# Patient Record
Sex: MALE | Race: WHITE | NOT HISPANIC OR LATINO | Employment: OTHER | URBAN - METROPOLITAN AREA
[De-identification: names, ages, dates, MRNs, and addresses within clinical notes are randomized per-mention and may not be internally consistent; named-entity substitution may affect disease eponyms.]

---

## 2018-04-20 ENCOUNTER — TELEPHONE (OUTPATIENT)
Dept: CARDIOLOGY | Facility: CLINIC | Age: 78
End: 2018-04-20

## 2018-04-20 RX ORDER — PROPAFENONE HYDROCHLORIDE 425 MG/1
425 CAPSULE, EXTENDED RELEASE ORAL EVERY 12 HOURS
Qty: 180 CAPSULE | Refills: 3 | OUTPATIENT
Start: 2018-04-20

## 2018-04-20 RX ORDER — PROPAFENONE HYDROCHLORIDE 425 MG/1
425 CAPSULE, EXTENDED RELEASE ORAL EVERY 12 HOURS
COMMUNITY
Start: 2017-05-23 | End: 2018-04-20 | Stop reason: SDUPTHER

## 2018-04-20 RX ORDER — PROPAFENONE HYDROCHLORIDE 425 MG/1
425 CAPSULE, EXTENDED RELEASE ORAL EVERY 12 HOURS
Qty: 180 CAPSULE | Refills: 3 | Status: SHIPPED | OUTPATIENT
Start: 2018-04-20 | End: 2018-05-04 | Stop reason: SDUPTHER

## 2018-05-03 PROBLEM — Z79.899 LONG TERM CURRENT USE OF ANTIARRHYTHMIC DRUG: Status: ACTIVE | Noted: 2018-05-03

## 2018-05-03 PROBLEM — I47.19 AVNRT (AV NODAL RE-ENTRY TACHYCARDIA) (CMS/HCC): Status: ACTIVE | Noted: 2018-05-03

## 2018-05-03 PROBLEM — Z79.01 CURRENT USE OF LONG TERM ANTICOAGULATION: Status: ACTIVE | Noted: 2018-05-03

## 2018-05-03 PROBLEM — I48.92 ATRIAL FIBRILLATION AND FLUTTER (CMS/HCC): Status: ACTIVE | Noted: 2018-05-03

## 2018-05-03 PROBLEM — I48.91 ATRIAL FIBRILLATION AND FLUTTER (CMS/HCC): Status: ACTIVE | Noted: 2018-05-03

## 2018-05-03 NOTE — PROGRESS NOTES
Electrophysiology  Outpatient Progress Note       Reason for visit:   Chief Complaint   Patient presents with   • Atrial Fibrillation   • Atrial Flutter       HPI   Matthew Rojas is a 77 y.o. male who is seen today for follow-up of his atrial arrhythmias. He has a history of atrial flutter and AVNRT both ablated at [facility] in the past. Mr. Rojas underwent Holter monitoring May 2017 that revealed 87% afib with heart rates ranging from  bpm with an average of 79 bpm. He had previously in April 2017 had a QUINCY/cardioversion with resumption of sinus rhythm. He is on propafenone and Eliquis.     At his previous appointment options of treatment where discussed, including antiarrhythmic/drug therapy and catheter ablation.  He opted for higher dose of his propafenone as a first course of treatment.  He does note occasional episodes of mild palpitations, but does not feel he has had any sustained episodes of atrial fibrillation.  He is tolerating the higher dose of Rythmol with no significant side effects.     Past Medical History:   Diagnosis Date   • Abnormal ECG    • Arrhythmia    • Atrial fibrillation and flutter (CMS/HCC) ([facility]) 5/3/2018   • AVNRT (AV mayra re-entry tachycardia) (CMS/HCC) ([facility]) 5/3/2018   • Current use of long term anticoagulation 5/3/2018   • Long term current use of antiarrhythmic drug 5/3/2018   • Prostate cancer (CMS/HCC) ([facility])      Past Surgical History:   Procedure Laterality Date   • ABLATION OF DYSRHYTHMIC FOCUS     • APPENDECTOMY         Social History   Substance Use Topics   • Smoking status: Former Smoker   • Smokeless tobacco: Never Used   • Alcohol use Yes     No family history on file.    ALLERGY:  Flecainide    Current Outpatient Prescriptions   Medication Sig Dispense Refill   • apixaban (ELIQUIS) 5 mg tablet Take 1 tablet (5 mg total) by mouth 2 (two) times a day. 60 tablet 11   • ascorbic acid (ascorbic acid with ramesh hips) 500 mg tablet Take by mouth daily.      • atorvastatin (LIPITOR) 20 mg tablet Take 20 mg by mouth daily.     • CHOLECALCIFEROL, VITAMIN D3, (VITAMIN D3 ORAL) Take by mouth daily.     • glucosamine sulfate 1,000 mg capsule Take by mouth daily.     • mirabegron (MYRBETRIQ) 50 mg ER tablet Take 50 mg by mouth daily.     • propafenone SR (RYTHMOL SR) 425 mg 12 hr capsule Take 1 capsule (425 mg total) by mouth every 12 (twelve) hours. 60 capsule 11     No current facility-administered medications for this visit.        Review of Systems   Constitution: Negative for weakness and malaise/fatigue.   HENT: Negative for hearing loss.    Eyes: Negative for visual disturbance.   Cardiovascular: Negative for chest pain, dyspnea on exertion, irregular heartbeat, leg swelling, near-syncope, orthopnea, palpitations, paroxysmal nocturnal dyspnea and syncope.   Respiratory: Negative for cough and shortness of breath.    Hematologic/Lymphatic: Negative for bleeding problem.   Skin: Negative for rash.   Musculoskeletal: Negative for joint pain and muscle weakness.   Gastrointestinal: Negative for abdominal pain.   Genitourinary: Negative for hematuria.   Neurological: Negative for focal weakness, paresthesias and tremors.   Psychiatric/Behavioral: Negative for altered mental status.       Objective   Vitals:    05/04/18 1607   BP: 128/70   Pulse: 60   Resp: 16       Physical Exam   Constitutional: He is oriented to person, place, and time. He appears well-developed and well-nourished.   HENT:   Head: Normocephalic and atraumatic.   Eyes: Conjunctivae are normal. Pupils are equal, round, and reactive to light.   Neck: Normal range of motion.   Cardiovascular: Normal rate, regular rhythm and normal heart sounds.    Pulmonary/Chest: Breath sounds normal. He has no wheezes. He has no rales.   Abdominal: Soft. Bowel sounds are normal. He exhibits no mass. There is no tenderness.   Musculoskeletal: Normal range of motion. He exhibits no edema or deformity.   Neurological: He is  alert and oriented to person, place, and time. No cranial nerve deficit.   Skin: Skin is warm and dry. No rash noted.   Psychiatric: He has a normal mood and affect. His behavior is normal.       Lab Results   Component Value Date    WBC 8.58 11/27/2016    HGB 14.5 11/27/2016     11/27/2016    CHOL 224 (H) 03/31/2015    TRIG 135 03/31/2015    HDL 50 03/31/2015    ALT 33 03/31/2015    AST 28 03/31/2015     11/27/2016    K 4.3 11/27/2016    CREATININE 0.9 11/27/2016    TSH 2.22 03/30/2015    INR 1.0 11/27/2016     Cardiovascular Procedures:  Electrophysiology:  Holter (SR with atrial fibrillation) - 5/2017   QUINCY/cardioversion-April 2017  Catheter ablation-at [facility] for AV node reentry and atrial flutter    ECG   Sinus rhythm and within normal limits.    Assessment   Problem List Items Addressed This Visit        Circulatory    Atrial fibrillation and flutter (CMS/HCC) (HCC) - Primary     He is tolerating the increased dose of propafenone at 425 mg SR, twice daily.  His arrhythmia at this time appears to be well controlled and will continue this dosage.  He will continue to be followed closely for any recurrence of arrhythmias.         Relevant Medications    atorvastatin (LIPITOR) 20 mg tablet    apixaban (ELIQUIS) 5 mg tablet    propafenone SR (RYTHMOL SR) 425 mg 12 hr capsule    Other Relevant Orders    ECG 12 lead (Completed)    AVNRT (AV mayra re-entry tachycardia) (CMS/HCC) (HCC)     He is status post ablation procedure at [facility] in the past.  He has had no symptoms that are suggestive of a recurrence of this tachycardia.         Relevant Medications    atorvastatin (LIPITOR) 20 mg tablet    apixaban (ELIQUIS) 5 mg tablet    propafenone SR (RYTHMOL SR) 425 mg 12 hr capsule       Other    Long term current use of antiarrhythmic drug     He is tolerating the increased dose of propafenone and the electrocardiogram is without abnormalities at today's visit.         Current use of  long term anticoagulation     Currently on Eliquis 5 mg twice daily with no adverse effects and no signs of bleeding.                    Krystian Garcia MD  5/18/2018

## 2018-05-03 NOTE — ASSESSMENT & PLAN NOTE
He is status post ablation procedure at [facility] in the past.  He has had no symptoms that are suggestive of a recurrence of this tachycardia.

## 2018-05-04 ENCOUNTER — OFFICE VISIT (OUTPATIENT)
Dept: CARDIOLOGY | Facility: CLINIC | Age: 78
End: 2018-05-04
Payer: MEDICARE

## 2018-05-04 VITALS
HEART RATE: 60 BPM | SYSTOLIC BLOOD PRESSURE: 128 MMHG | DIASTOLIC BLOOD PRESSURE: 70 MMHG | RESPIRATION RATE: 16 BRPM | WEIGHT: 183 LBS

## 2018-05-04 DIAGNOSIS — I48.91 ATRIAL FIBRILLATION AND FLUTTER (CMS/HCC): Primary | ICD-10-CM

## 2018-05-04 DIAGNOSIS — I47.19 AVNRT (AV NODAL RE-ENTRY TACHYCARDIA) (CMS/HCC): ICD-10-CM

## 2018-05-04 DIAGNOSIS — Z79.899 LONG TERM CURRENT USE OF ANTIARRHYTHMIC DRUG: ICD-10-CM

## 2018-05-04 DIAGNOSIS — Z79.01 CURRENT USE OF LONG TERM ANTICOAGULATION: ICD-10-CM

## 2018-05-04 DIAGNOSIS — I48.92 ATRIAL FIBRILLATION AND FLUTTER (CMS/HCC): Primary | ICD-10-CM

## 2018-05-04 PROCEDURE — 99214 OFFICE O/P EST MOD 30 MIN: CPT | Performed by: INTERNAL MEDICINE

## 2018-05-04 PROCEDURE — 93000 ELECTROCARDIOGRAM COMPLETE: CPT | Performed by: INTERNAL MEDICINE

## 2018-05-04 RX ORDER — ASCORBIC ACID 500 MG
TABLET ORAL DAILY
COMMUNITY

## 2018-05-04 RX ORDER — ATORVASTATIN CALCIUM 20 MG/1
20 TABLET, FILM COATED ORAL DAILY
COMMUNITY

## 2018-05-04 RX ORDER — MIRABEGRON 50 MG/1
50 TABLET, FILM COATED, EXTENDED RELEASE ORAL DAILY
COMMUNITY

## 2018-05-04 RX ORDER — PROPAFENONE HYDROCHLORIDE 425 MG/1
425 CAPSULE, EXTENDED RELEASE ORAL EVERY 12 HOURS
Qty: 60 CAPSULE | Refills: 11 | Status: SHIPPED | OUTPATIENT
Start: 2018-05-04 | End: 2019-05-21 | Stop reason: SDUPTHER

## 2018-05-04 RX ORDER — PETROLATUM,WHITE/LANOLIN
OINTMENT (GRAM) TOPICAL DAILY
COMMUNITY

## 2018-05-04 NOTE — LETTER
May 18, 2018     Bridgette Galvez MD  [address] [place] [address]    Patient: Matthew Rojas   YOB: 1940   Date of Visit: 5/4/2018       Dear Dr. Galvez:    Thank you for referring Matthew Rojas to me for evaluation. Below are my notes for this consultation.    If you have questions, please do not hesitate to call me. I look forward to following your patient along with you.         Sincerely,        Krystian Garcia MD        CC: No Recipients  Krystian Garcia MD  5/18/2018  7:39 PM  Sign at close encounter       Electrophysiology  Outpatient Progress Note       Reason for visit:   Chief Complaint   Patient presents with   • Atrial Fibrillation   • Atrial Flutter       HPI   Matthew Rojas is a 77 y.o. male who is seen today for follow-up of his atrial arrhythmias. He has a history of atrial flutter and AVNRT both ablated at [facility] in the past. Mr. Rojas underwent Holter monitoring May 2017 that revealed 87% afib with heart rates ranging from  bpm with an average of 79 bpm. He had previously in April 2017 had a QUINCY/cardioversion with resumption of sinus rhythm. He is on propafenone and Eliquis.     At his previous appointment options of treatment where discussed, including antiarrhythmic/drug therapy and catheter ablation.  He opted for higher dose of his propafenone as a first course of treatment.  He does note occasional episodes of mild palpitations, but does not feel he has had any sustained episodes of atrial fibrillation.  He is tolerating the higher dose of Rythmol with no significant side effects.     Past Medical History:   Diagnosis Date   • Abnormal ECG    • Arrhythmia    • Atrial fibrillation and flutter (CMS/HCC) ([facility]) 5/3/2018   • AVNRT (AV mayra re-entry tachycardia) (CMS/HCC) ([facility]) 5/3/2018   • Current use of long term anticoagulation 5/3/2018   • Long term current use of antiarrhythmic drug 5/3/2018   • Prostate cancer (CMS/HCC) ([facility])      Past  Surgical History:   Procedure Laterality Date   • ABLATION OF DYSRHYTHMIC FOCUS     • APPENDECTOMY         Social History   Substance Use Topics   • Smoking status: Former Smoker   • Smokeless tobacco: Never Used   • Alcohol use Yes     No family history on file.    ALLERGY:  Flecainide    Current Outpatient Prescriptions   Medication Sig Dispense Refill   • apixaban (ELIQUIS) 5 mg tablet Take 1 tablet (5 mg total) by mouth 2 (two) times a day. 60 tablet 11   • ascorbic acid (ascorbic acid with ramesh hips) 500 mg tablet Take by mouth daily.     • atorvastatin (LIPITOR) 20 mg tablet Take 20 mg by mouth daily.     • CHOLECALCIFEROL, VITAMIN D3, (VITAMIN D3 ORAL) Take by mouth daily.     • glucosamine sulfate 1,000 mg capsule Take by mouth daily.     • mirabegron (MYRBETRIQ) 50 mg ER tablet Take 50 mg by mouth daily.     • propafenone SR (RYTHMOL SR) 425 mg 12 hr capsule Take 1 capsule (425 mg total) by mouth every 12 (twelve) hours. 60 capsule 11     No current facility-administered medications for this visit.        ROS    Objective   Vitals:    05/04/18 1607   BP: 128/70   Pulse: 60   Resp: 16       Physical Exam    Lab Results   Component Value Date    WBC 8.58 11/27/2016    HGB 14.5 11/27/2016     11/27/2016    CHOL 224 (H) 03/31/2015    TRIG 135 03/31/2015    HDL 50 03/31/2015    ALT 33 03/31/2015    AST 28 03/31/2015     11/27/2016    K 4.3 11/27/2016    CREATININE 0.9 11/27/2016    TSH 2.22 03/30/2015    INR 1.0 11/27/2016     Cardiovascular Procedures:  Electrophysiology:  Holter (SR with atrial fibrillation) - 5/2017   QUINCY/cardioversion-April 2017  Catheter ablation-at [facility] for AV node reentry and atrial flutter    ECG   ***    Assessment   Problem List Items Addressed This Visit        Circulatory    Atrial fibrillation and flutter (CMS/HCC) (HCC) - Primary     He is tolerating the increased dose of propafenone at 425 mg SR, twice daily.  His arrhythmia at this time appears to be  well controlled and will continue this dosage.  He will continue to be followed closely for any recurrence of arrhythmias.         Relevant Medications    atorvastatin (LIPITOR) 20 mg tablet    apixaban (ELIQUIS) 5 mg tablet    propafenone SR (RYTHMOL SR) 425 mg 12 hr capsule    Other Relevant Orders    ECG 12 lead (Completed)    AVNRT (AV mayra re-entry tachycardia) (CMS/HCC) (HCC)     He is status post ablation procedure at [facility] in the past.  He has had no symptoms that are suggestive of a recurrence of this tachycardia.         Relevant Medications    atorvastatin (LIPITOR) 20 mg tablet    apixaban (ELIQUIS) 5 mg tablet    propafenone SR (RYTHMOL SR) 425 mg 12 hr capsule       Other    Long term current use of antiarrhythmic drug     He is tolerating the increased dose of propafenone and the electrocardiogram is without abnormalities at today's visit.         Current use of long term anticoagulation     Currently on Eliquis 5 mg twice daily with no adverse effects and no signs of bleeding.                    Krystian Garcia MD  5/18/2018

## 2018-05-09 NOTE — ASSESSMENT & PLAN NOTE
He is tolerating the increased dose of propafenone and the electrocardiogram is without abnormalities at today's visit.

## 2018-05-09 NOTE — ASSESSMENT & PLAN NOTE
He is tolerating the increased dose of propafenone at 425 mg SR, twice daily.  His arrhythmia at this time appears to be well controlled and will continue this dosage.  He will continue to be followed closely for any recurrence of arrhythmias.

## 2018-05-18 ASSESSMENT — ENCOUNTER SYMPTOMS
PALPITATIONS: 0
TREMORS: 0
PARESTHESIAS: 0
SHORTNESS OF BREATH: 0
SYNCOPE: 0
NEAR-SYNCOPE: 0
ALTERED MENTAL STATUS: 0
ORTHOPNEA: 0
HEMATURIA: 0
WEAKNESS: 0
IRREGULAR HEARTBEAT: 0
COUGH: 0
PND: 0
ABDOMINAL PAIN: 0
DYSPNEA ON EXERTION: 0
FOCAL WEAKNESS: 0

## 2019-05-21 RX ORDER — PROPAFENONE HYDROCHLORIDE 425 MG/1
425 CAPSULE, EXTENDED RELEASE ORAL EVERY 12 HOURS
Qty: 60 CAPSULE | Refills: 11 | Status: SHIPPED | OUTPATIENT
Start: 2019-05-21 | End: 2020-05-15 | Stop reason: SDUPTHER

## 2019-07-15 ENCOUNTER — HOSPITAL ENCOUNTER (EMERGENCY)
Facility: HOSPITAL | Age: 79
Discharge: HOME | End: 2019-07-15
Attending: EMERGENCY MEDICINE
Payer: MEDICARE

## 2019-07-15 VITALS
TEMPERATURE: 97.1 F | HEART RATE: 68 BPM | DIASTOLIC BLOOD PRESSURE: 78 MMHG | SYSTOLIC BLOOD PRESSURE: 148 MMHG | RESPIRATION RATE: 16 BRPM | OXYGEN SATURATION: 97 %

## 2019-07-15 DIAGNOSIS — S80.812A ABRASION OF ANTERIOR LEFT LOWER LEG, INITIAL ENCOUNTER: Primary | ICD-10-CM

## 2019-07-15 PROCEDURE — 99281 EMR DPT VST MAYX REQ PHY/QHP: CPT

## 2019-07-15 RX ORDER — ZALEPLON 10 MG/1
10 CAPSULE ORAL NIGHTLY PRN
COMMUNITY
Start: 2019-06-19 | End: 2024-07-09

## 2019-07-15 RX ORDER — TRAZODONE HYDROCHLORIDE 50 MG/1
TABLET ORAL NIGHTLY PRN
COMMUNITY
Start: 2019-06-19

## 2019-07-15 ASSESSMENT — ENCOUNTER SYMPTOMS
COLOR CHANGE: 0
DIARRHEA: 0
SORE THROAT: 0
ABDOMINAL PAIN: 0
WEAKNESS: 0
NUMBNESS: 0
WOUND: 1
EXTREMITY NUMBNESS: 0
FATIGUE: 0
FEVER: 0
VOMITING: 0
COUGH: 0
SHORTNESS OF BREATH: 0

## 2019-07-16 ASSESSMENT — ENCOUNTER SYMPTOMS
CONFUSION: 0
APPETITE CHANGE: 0

## 2019-07-16 NOTE — ED PROVIDER NOTES
HPI     Chief Complaint   Patient presents with   • Lower Extremity Issue     Matthew Rojas is a 78 y.o. male with a PMHx of Afib (Eliquis) who presents to the ED for evaluation of LLE wound s/p injury (abrasion) 10 days PTA. He states that he hit his leg on the bumper of his car. Pt notes that he was evaluated at  4 days PTA, tx with RX of Keflex 500mg TID and a topical antibiotic. Today is day 4.  Pt was prompted to seek care in the ED due to increasing redness around the wound. Pt with allergy to flecainide. Denies chest pain, shortness of breath, abdominal pain, nausea, vomiting, diarrhea, fever, chills, numbness, tingling, gait difficulty, or any other concerns.     PCP: Bridgette Galvez MD       History provided by:  Patient   used: No    Lower Extremity Issue   Location:  Leg  Time since incident:  10 days  Injury: yes    Mechanism of injury comment:  Abrasion by metal car bumper  Leg location:  L lower leg  Pain details:     Quality:  Aching    Radiates to:  Does not radiate    Severity:  Mild    Onset quality:  Gradual    Duration:  10 days    Timing:  Constant    Progression:  Improving  Chronicity:  New  Dislocation: no    Foreign body present:  No foreign bodies  Tetanus status:  Unknown  Prior injury to area:  No  Relieved by: Rx PO and topical abx.  Worsened by:  Nothing  Associated symptoms: no decreased ROM, no fatigue, no fever, no numbness, no swelling and no tingling    Risk factors: no obesity         Patient History     Past Medical History:   Diagnosis Date   • Abnormal ECG    • Arrhythmia    • Atrial fibrillation and flutter (CMS/HCC) ([facility]) 5/3/2018   • AVNRT (AV mayra re-entry tachycardia) (CMS/HCC) ([facility]) 5/3/2018   • Current use of long term anticoagulation 5/3/2018   • Long term current use of antiarrhythmic drug 5/3/2018   • Prostate cancer (CMS/HCC) ([facility])        Past Surgical History:   Procedure Laterality Date   • ABLATION OF DYSRHYTHMIC FOCUS     • APPENDECTOMY          History reviewed. No pertinent family history.    Social History   Substance Use Topics   • Smoking status: Former Smoker   • Smokeless tobacco: Never Used   • Alcohol use Yes       Systems Reviewed from Nursing Triage:          Review of Systems     Review of Systems   Constitutional: Negative for appetite change, fatigue and fever.   HENT: Negative for sore throat.    Respiratory: Negative for cough and shortness of breath.    Cardiovascular: Negative for chest pain.   Gastrointestinal: Negative for abdominal pain, diarrhea and vomiting.   Musculoskeletal: Negative for gait problem.        Leg pain   Skin: Positive for wound (to anterior LLE, no active bleeding). Negative for color change.   Neurological: Negative for weakness and numbness.   Psychiatric/Behavioral: Negative for confusion.        Physical Exam     ED Triage Vitals [07/15/19 2147]   Temp Heart Rate Resp BP SpO2   36.2 °C (97.1 °F) 66 18 (!) 148/78 96 %      Temp Source Heart Rate Source Patient Position BP Location FiO2 (%) (Set)   Tympanic -- -- -- --       Pulse Ox %: 96 % (07/15/19 2254)  Pulse Ox Interpretation: Normal (07/15/19 2254)          Patient Vitals for the past 24 hrs:   BP Temp Temp src Pulse Resp SpO2   07/15/19 2309 - - - 68 16 97 %   07/15/19 2147 (!) 148/78 36.2 °C (97.1 °F) Tympanic 66 18 96 %           Physical Exam   Constitutional: He is oriented to person, place, and time. He appears well-developed. No distress.   HENT:   Head: Atraumatic.   Nose: Nose normal.   Eyes: Pupils are equal, round, and reactive to light. Conjunctivae are normal.   Neck: Normal range of motion. Neck supple.   Cardiovascular: Normal rate and intact distal pulses.    Pulses:       Dorsalis pedis pulses are 2+ on the left side.        Posterior tibial pulses are 2+ on the left side.   Pulmonary/Chest: Effort normal.   Musculoskeletal: Normal range of motion. He exhibits no edema, tenderness or deformity.        Left ankle: Normal. He exhibits  normal range of motion, no swelling and no ecchymosis. No tenderness. No proximal fibula tenderness found.        Left lower leg: He exhibits laceration (abrasion). He exhibits no tenderness, no bony tenderness and no swelling.        Legs:       Left foot: Normal. There is normal range of motion, no tenderness and no bony tenderness.   Neurological: He is alert and oriented to person, place, and time. He has normal strength. He is not disoriented. No sensory deficit. Gait normal.   Skin: Skin is warm and dry. Abrasion noted.   2 cm abrasion, healing well with scant amount of erythema. No warmth.   Psychiatric: He has a normal mood and affect. His behavior is normal.   Nursing note and vitals reviewed.           Procedures    ED Course & MDM     Labs Reviewed - No data to display    No orders to display               MDM         ED Course as of Jul 16 0305   Mon Jul 15, 2019   2247 10:47 PM  Pt is a 78 y.o. male presenting to the ED for evaluation of LLE wound. Pt was seen and evaluated. Discussed with pt regarding treatment plan. Wound appears well with no signs of a worsening infection. Pt instructed to continue the po and topical antibiotic as prescribed    [LB]      ED Course User Index  [LB] Leann Karimi PA C         Clinical Impressions as of Jul 16 0305   Abrasion of anterior left lower leg, initial encounter      By signing my name below, Gunnar SANDOVAL, attest that this documentation has been prepared under the direction and in the presence of Leann Karimi PA-C.  7/16/2019 3:05 AM    Leann SANDOVAL, personally performed the services described in this documentation, as documented by the scribe in my presence, and it is both accurate and complete.  7/16/2019 3:06 AM     Gunnar Rowley  07/15/19 7864       Leann Karimi PA C  07/16/19 030

## 2019-07-16 NOTE — DISCHARGE INSTRUCTIONS
KEEP THE WOUND CLEAN AND DRY, WASH WITH SOAP AND WATER  APPLY TOPICAL ANTIBIOTIC OINTMENT   DO NOT USE PEROXIDE OR ALCOHOL    CONTINUE THE KEFELX AS DIRECTED.   TAKE TYLENOL AS NEEDED FOR PAIN    RETURN TO THE ER IF ANY INCREASE IN FEVER>101, INCREASE LEG PAIN, SWELLING, NUMBNESS OR TINGLING IN YOUR TOES, CHANGE IN COLOR OR TEMPERATURE OF YOUR TOES, INCREASE REDNESS OR WARMTH AROUND THE WOUND, CALF PAIN, CHEST PAIN, SHORTNESS OF BREATH OR ANY CONCERNS

## 2019-07-16 NOTE — ED ATTESTATION NOTE
I have personally seen and examined the patient.  I reviewed and agree with physician assistant / nurse practitioner’s assessment and plan of care    My examination, assessment, and plan of care of  is as follows:     Patient presents with abrasion on his left shin that occurred while he was unsure.  He is on urgent care and is taking Keflex.  Someone looked at it and said it looked slightly red so he came in for second opinion    Exam   patient awake alert and oriented no acute distress  Left shin has small 1 cm abrasion that is healing well no surround erythema or warmth      Plan gave patient reassurance and encouraged him to continue taking antibiotics.  Patient discharged     Sandeep Alcazar MD  07/16/19 3108

## 2020-01-17 ENCOUNTER — TELEPHONE (OUTPATIENT)
Dept: SCHEDULING | Facility: CLINIC | Age: 80
End: 2020-01-17

## 2020-01-17 NOTE — TELEPHONE ENCOUNTER
Pt is having an epidural in his back on 1/22 by Dr Hill.  Pt would like to know instructions for holding his Eliquis pre procedure and when to start it post procedure.  Pt can be reached at [phone].

## 2020-01-17 NOTE — TELEPHONE ENCOUNTER
He has not been seen in this office in a was 2 years.  I explained that he may hold his Eliquis 2 to 3 days prior to his epidural.  I am unaware of his kidney function as he has no recent lab work through [facility].  He should restart it as soon as possible at the discretion of his orthopedic physician.  We also discussed the low risk of stroke anytime anticoagulation is interrupted.  He is aware of this risk.    I asked him to call the office to make it a follow-up appointment.

## 2020-05-15 ENCOUNTER — TELEPHONE (OUTPATIENT)
Dept: CARDIOLOGY | Facility: CLINIC | Age: 80
End: 2020-05-15

## 2020-05-15 RX ORDER — PROPAFENONE HYDROCHLORIDE 425 MG/1
425 CAPSULE, EXTENDED RELEASE ORAL EVERY 12 HOURS
Qty: 60 CAPSULE | Refills: 0 | Status: SHIPPED | OUTPATIENT
Start: 2020-05-15 | End: 2020-07-17 | Stop reason: SDUPTHER

## 2020-05-15 NOTE — TELEPHONE ENCOUNTER
Last appointment was 5/4/18, no upcoming appointments scheduled.    Gave a 60 day supply with no refills.    PSR - Please call patient to schedule a follow up appointment. Thank you

## 2020-05-15 NOTE — TELEPHONE ENCOUNTER
HI.     Last appointment was 5/4/18, no upcoming appointments scheduled.     Gave a 60 day supply of propafenone with no refills.     PSR - Please call patient to schedule a follow up appointment. He needs EKG as it has been a long while      Thank you

## 2020-06-01 ENCOUNTER — TELEPHONE (OUTPATIENT)
Dept: CARDIOLOGY | Facility: CLINIC | Age: 80
End: 2020-06-01

## 2020-06-01 NOTE — TELEPHONE ENCOUNTER
Called patient left message regarding change in appointment from 6/9/20 to 6/12/20 @1:40pm please confirm change.

## 2020-06-11 NOTE — TELEPHONE ENCOUNTER
Tried calling patient to pre register for appt with dr culver tomorrow. Every time I would try to leave a voicemail the machine would cut me off.

## 2020-07-17 ENCOUNTER — TELEPHONE (OUTPATIENT)
Dept: SCHEDULING | Facility: CLINIC | Age: 80
End: 2020-07-17

## 2020-07-17 RX ORDER — PROPAFENONE HYDROCHLORIDE 425 MG/1
425 CAPSULE, EXTENDED RELEASE ORAL EVERY 12 HOURS
Qty: 60 CAPSULE | Refills: 1 | Status: SHIPPED | OUTPATIENT
Start: 2020-07-17 | End: 2020-09-25

## 2020-07-17 NOTE — TELEPHONE ENCOUNTER
Medicine Refill Request    Last Office Visit: Visit date not found  Last Telemedicine Visit: Visit date not found    Next Office Visit: Visit date not found  Next Telemedicine Visit: Visit date not found     propafenone SR (RYTHMOL SR) 425 mg 12 hr capsule         Current Outpatient Medications:   •  apixaban (ELIQUIS) 5 mg tablet, Take 1 tablet (5 mg total) by mouth 2 (two) times a day., Disp: 60 tablet, Rfl: 11  •  ascorbic acid (ascorbic acid with ramesh hips) 500 mg tablet, Take by mouth daily., Disp: , Rfl:   •  atorvastatin (LIPITOR) 20 mg tablet, Take 20 mg by mouth daily., Disp: , Rfl:   •  CHOLECALCIFEROL, VITAMIN D3, (VITAMIN D3 ORAL), Take by mouth daily., Disp: , Rfl:   •  glucosamine sulfate 1,000 mg capsule, Take by mouth daily., Disp: , Rfl:   •  mirabegron (MYRBETRIQ) 50 mg ER tablet, Take 50 mg by mouth daily., Disp: , Rfl:   •  propafenone SR (RYTHMOL SR) 425 mg 12 hr capsule, Take 1 capsule (425 mg total) by mouth every 12 (twelve) hours., Disp: 60 capsule, Rfl: 0  •  traZODone (DESYREL) 50 mg tablet, , Disp: , Rfl:   •  zaleplon (SONATA) 10 mg capsule, , Disp: , Rfl:       BP Readings from Last 3 Encounters:   07/15/19 (!) 148/78   05/04/18 128/70       Recent Lab results:  Lab Results   Component Value Date    CHOL 224 (H) 03/31/2015   ,   Lab Results   Component Value Date    HDL 50 03/31/2015   ,   Lab Results   Component Value Date    LDLCALC 147 (H) 03/31/2015   ,   Lab Results   Component Value Date    TRIG 135 03/31/2015        Lab Results   Component Value Date    GLUCOSE 127 (H) 11/27/2016   , No results found for: HGBA1C      Lab Results   Component Value Date    CREATININE 0.9 11/27/2016       Lab Results   Component Value Date    TSH 2.22 03/30/2015

## 2020-08-20 ENCOUNTER — TELEPHONE (OUTPATIENT)
Dept: CARDIOLOGY | Facility: CLINIC | Age: 80
End: 2020-08-20

## 2020-08-20 NOTE — TELEPHONE ENCOUNTER
Left message to pre reg for 8/21    Please verify demographics, do travel screening, and explain visitor restrictions if patient calls back

## 2020-09-04 ENCOUNTER — TELEPHONE (OUTPATIENT)
Dept: SCHEDULING | Facility: CLINIC | Age: 80
End: 2020-09-04

## 2020-09-04 NOTE — TELEPHONE ENCOUNTER
Reveiwed issues with Motrin and anticoagulation. He has been taking 400mg daily X1 week. I asked him not to take any more than that, to take with food and watch for black tarry stools. I encouraged him to try a few days per week off of the Motrin and seek other means to contriol back pain

## 2020-09-04 NOTE — TELEPHONE ENCOUNTER
Patient states he has been having back pain and has been taking Motrin, and wants to know if that is ok to take with his Eliquis and Propafenone.    [phone]

## 2020-09-25 ENCOUNTER — TRANSCRIBE ORDERS (OUTPATIENT)
Dept: SCHEDULING | Age: 80
End: 2020-09-25

## 2020-09-25 DIAGNOSIS — M25.561 PAIN IN RIGHT KNEE: Primary | ICD-10-CM

## 2020-09-25 DIAGNOSIS — M25.562 PAIN IN LEFT KNEE: ICD-10-CM

## 2020-09-26 ENCOUNTER — HOSPITAL ENCOUNTER (OUTPATIENT)
Dept: RADIOLOGY | Age: 80
Discharge: HOME | End: 2020-09-26
Attending: PHYSICAL MEDICINE & REHABILITATION
Payer: MEDICARE

## 2020-09-26 DIAGNOSIS — M25.562 PAIN IN LEFT KNEE: ICD-10-CM

## 2020-09-26 DIAGNOSIS — M25.561 PAIN IN RIGHT KNEE: ICD-10-CM

## 2020-09-26 PROCEDURE — 73562 X-RAY EXAM OF KNEE 3: CPT | Mod: 50

## 2020-09-26 PROCEDURE — 72190 X-RAY EXAM OF PELVIS: CPT

## 2020-09-29 ENCOUNTER — OFFICE VISIT (OUTPATIENT)
Dept: CARDIOLOGY | Facility: CLINIC | Age: 80
End: 2020-09-29
Payer: MEDICARE

## 2020-09-29 VITALS
HEART RATE: 69 BPM | DIASTOLIC BLOOD PRESSURE: 78 MMHG | HEIGHT: 70 IN | RESPIRATION RATE: 18 BRPM | BODY MASS INDEX: 25.96 KG/M2 | SYSTOLIC BLOOD PRESSURE: 136 MMHG | WEIGHT: 181.3 LBS

## 2020-09-29 DIAGNOSIS — I48.92 ATRIAL FIBRILLATION AND FLUTTER (CMS/HCC): Primary | ICD-10-CM

## 2020-09-29 DIAGNOSIS — I48.91 ATRIAL FIBRILLATION AND FLUTTER (CMS/HCC): Primary | ICD-10-CM

## 2020-09-29 DIAGNOSIS — Z79.899 LONG TERM CURRENT USE OF ANTIARRHYTHMIC DRUG: ICD-10-CM

## 2020-09-29 DIAGNOSIS — Z79.01 CURRENT USE OF LONG TERM ANTICOAGULATION: ICD-10-CM

## 2020-09-29 DIAGNOSIS — Z23 NEED FOR PROPHYLACTIC VACCINATION AND INOCULATION AGAINST INFLUENZA: ICD-10-CM

## 2020-09-29 DIAGNOSIS — I47.19 AVNRT (AV NODAL RE-ENTRY TACHYCARDIA) (CMS/HCC): ICD-10-CM

## 2020-09-29 PROCEDURE — 99214 OFFICE O/P EST MOD 30 MIN: CPT | Performed by: INTERNAL MEDICINE

## 2020-09-29 PROCEDURE — 93000 ELECTROCARDIOGRAM COMPLETE: CPT | Performed by: INTERNAL MEDICINE

## 2020-09-29 RX ORDER — PROPAFENONE HYDROCHLORIDE 425 MG/1
425 CAPSULE, EXTENDED RELEASE ORAL 2 TIMES DAILY
Qty: 60 CAPSULE | Refills: 11 | Status: SHIPPED | OUTPATIENT
Start: 2020-09-29 | End: 2021-10-18

## 2020-09-29 ASSESSMENT — ENCOUNTER SYMPTOMS
HEMATURIA: 0
ORTHOPNEA: 0
FOCAL WEAKNESS: 0
ABDOMINAL PAIN: 0
COUGH: 0
TREMORS: 0
WEAKNESS: 0
PALPITATIONS: 0
PARESTHESIAS: 0
IRREGULAR HEARTBEAT: 0
SYNCOPE: 0
PND: 0
ALTERED MENTAL STATUS: 0
DYSPNEA ON EXERTION: 0
SHORTNESS OF BREATH: 0
NEAR-SYNCOPE: 0

## 2020-09-29 NOTE — PROGRESS NOTES
Electrophysiology  Outpatient Progress Note       Reason for visit:   Chief Complaint   Patient presents with   • Atrial Fibrillation   • Atrial Flutter       HPI   Matthew Rojas is a 79 y.o. male who is seen today for follow-up of his atrial arrhythmias. He has a history of atrial flutter and AVNRT both ablated at [facility] in the past. Mr. Rojas underwent Holter monitoring May 2017 that revealed 87% afib with heart rates ranging from  bpm with an average of 79 bpm. He had previously in April 2017 had a QUINCY/cardioversion with resumption of sinus rhythm. He is on propafenone and Eliquis.     He had been having increased symptoms and propafenone was increased. He has since tolerated the higher dose with much less symptoms of AF.  He does note occasional palpitations, but no sustained episodes of atrial fibrillation.  He is without significant side effects.      Past Medical History:   Diagnosis Date   • Abnormal ECG    • Arrhythmia    • Atrial fibrillation and flutter (CMS/HCC) 5/3/2018   • AVNRT (AV mayra re-entry tachycardia) (CMS/[facility]) 5/3/2018   • Current use of long term anticoagulation 5/3/2018   • Long term current use of antiarrhythmic drug 5/3/2018   • Prostate cancer (CMS/HCC)      Past Surgical History:   Procedure Laterality Date   • ABLATION OF DYSRHYTHMIC FOCUS     • APPENDECTOMY         Social History     Tobacco Use   • Smoking status: Former Smoker   • Smokeless tobacco: Never Used   Substance Use Topics   • Alcohol use: Yes   • Drug use: Not on file     Family History   Problem Relation Age of Onset   • Cancer Biological Mother    • Cancer Biological Father        ALLERGY:  Flecainide    Current Outpatient Medications   Medication Sig Dispense Refill   • apixaban (ELIQUIS) 5 mg tablet Take 1 tablet (5 mg total) by mouth 2 (two) times a day. 60 tablet 11   • atorvastatin (LIPITOR) 20 mg tablet Take 20 mg by mouth daily.     • CHOLECALCIFEROL, VITAMIN D3, (VITAMIN D3 ORAL) Take  by mouth daily.     • mirabegron (MYRBETRIQ) 50 mg ER tablet Take 50 mg by mouth daily.     • propafenone SR (RYTHMOL SR) 425 mg 12 hr capsule Take 1 capsule (425 mg total) by mouth 2 (two) times a day. 60 capsule 11   • zaleplon (SONATA) 10 mg capsule      • ascorbic acid (ascorbic acid with ramesh hips) 500 mg tablet Take by mouth daily.     • glucosamine sulfate 1,000 mg capsule Take by mouth daily.     • traZODone (DESYREL) 50 mg tablet        No current facility-administered medications for this visit.        Review of Systems   Constitution: Negative for malaise/fatigue.   HENT: Negative for hearing loss.    Eyes: Negative for visual disturbance.   Cardiovascular: Negative for chest pain, dyspnea on exertion, irregular heartbeat, leg swelling, near-syncope, orthopnea, palpitations, paroxysmal nocturnal dyspnea and syncope.   Respiratory: Negative for cough and shortness of breath.    Hematologic/Lymphatic: Negative for bleeding problem.   Skin: Negative for rash.   Musculoskeletal: Negative for joint pain and muscle weakness.   Gastrointestinal: Negative for abdominal pain.   Genitourinary: Negative for hematuria.   Neurological: Negative for focal weakness, paresthesias, tremors and weakness.   Psychiatric/Behavioral: Negative for altered mental status.       Objective   Vitals:    09/29/20 1545   BP: 136/78   Pulse: 69   Resp: 18       Physical Exam   Constitutional: He is oriented to person, place, and time. He appears well-developed and well-nourished.   HENT:   Head: Normocephalic and atraumatic.   Eyes: Pupils are equal, round, and reactive to light. Conjunctivae are normal.   Neck: Normal range of motion.   Cardiovascular: Normal rate, regular rhythm and normal heart sounds.   Pulmonary/Chest: Breath sounds normal. He has no wheezes. He has no rales.   Abdominal: Soft. Bowel sounds are normal. He exhibits no mass. There is no abdominal tenderness.   Musculoskeletal: Normal range of motion.          General: No deformity or edema.   Neurological: He is alert and oriented to person, place, and time. No cranial nerve deficit.   Skin: Skin is warm and dry. No rash noted.   Psychiatric: He has a normal mood and affect. His behavior is normal.       Lab Results   Component Value Date    WBC 8.58 11/27/2016    HGB 14.5 11/27/2016     11/27/2016    CHOL 224 (H) 03/31/2015    TRIG 135 03/31/2015    HDL 50 03/31/2015    ALT 33 03/31/2015    AST 28 03/31/2015     11/27/2016    K 4.3 11/27/2016    CREATININE 0.9 11/27/2016    TSH 2.22 03/30/2015    INR 1.0 11/27/2016     Cardiovascular Procedures:  Electrophysiology:  Holter (SR with atrial fibrillation) - 5/2017   QUINCY/cardioversion-April 2017  Catheter ablation-at [facility] for AV node reentry and atrial flutter    ECG   Sinus rhythm and within normal limits.    Assessment   Problem List Items Addressed This Visit        Circulatory    Atrial fibrillation and flutter (CMS/HCC) - Primary     He remains stable on propafenone at 425 mg SR, twice daily.  His arrhythmia at this time appears to be well controlled and we will continue to follow for any recurrence of arrhythmias.         Relevant Medications    propafenone SR (RYTHMOL SR) 425 mg 12 hr capsule    apixaban (ELIQUIS) 5 mg tablet    Other Relevant Orders    ECG 12 LEAD-OFFICE PERFORMED (Completed)    AVNRT (AV mayra re-entry tachycardia) (CMS/HCC)     He is status post ablation procedure at [facility] in the past.  He has had no symptoms that are suggestive of a recurrence of this tachycardia.         Relevant Medications    propafenone SR (RYTHMOL SR) 425 mg 12 hr capsule    apixaban (ELIQUIS) 5 mg tablet       Other    Long term current use of antiarrhythmic drug     He is tolerating the increased dose of propafenone and the electrocardiogram is without abnormalities at today's visit.         Current use of long term anticoagulation     Currently on Eliquis 5 mg twice daily with no  adverse effects and no signs of bleeding.           Other Visit Diagnoses     Need for prophylactic vaccination and inoculation against influenza        Relevant Medications    propafenone SR (RYTHMOL SR) 425 mg 12 hr capsule               Krystian Garcia MD  10/8/2020

## 2020-09-30 NOTE — ASSESSMENT & PLAN NOTE
He is status post ablation procedure at [facility] in the past.  He has had no symptoms that are suggestive of a recurrence of this tachycardia.

## 2020-09-30 NOTE — ASSESSMENT & PLAN NOTE
He remains stable on propafenone at 425 mg SR, twice daily.  His arrhythmia at this time appears to be well controlled and we will continue to follow for any recurrence of arrhythmias.

## 2021-01-30 DIAGNOSIS — Z23 ENCOUNTER FOR IMMUNIZATION: ICD-10-CM

## 2021-02-03 ENCOUNTER — IMMUNIZATIONS (OUTPATIENT)
Dept: FAMILY MEDICINE CLINIC | Facility: HOSPITAL | Age: 81
End: 2021-02-03

## 2021-02-03 DIAGNOSIS — Z23 ENCOUNTER FOR IMMUNIZATION: Primary | ICD-10-CM

## 2021-02-03 PROCEDURE — 91301 SARS-COV-2 / COVID-19 MRNA VACCINE (MODERNA) 100 MCG: CPT

## 2021-02-03 PROCEDURE — 0011A SARS-COV-2 / COVID-19 MRNA VACCINE (MODERNA) 100 MCG: CPT

## 2021-03-03 ENCOUNTER — IMMUNIZATIONS (OUTPATIENT)
Dept: FAMILY MEDICINE CLINIC | Facility: HOSPITAL | Age: 81
End: 2021-03-03

## 2021-03-03 DIAGNOSIS — Z23 ENCOUNTER FOR IMMUNIZATION: Primary | ICD-10-CM

## 2021-03-03 PROCEDURE — 91301 SARS-COV-2 / COVID-19 MRNA VACCINE (MODERNA) 100 MCG: CPT

## 2021-03-03 PROCEDURE — 0012A SARS-COV-2 / COVID-19 MRNA VACCINE (MODERNA) 100 MCG: CPT

## 2021-06-22 ENCOUNTER — TELEPHONE (OUTPATIENT)
Dept: SCHEDULING | Facility: CLINIC | Age: 81
End: 2021-06-22

## 2021-06-22 NOTE — TELEPHONE ENCOUNTER
Pt called stating he is having a colonoscopy on 7/12/2021 with Dr Deshawn Colon. Dr Colon is wanting the pt to have either an Echo or Stress Echo, prior to the 7/12/21 colonoscopy, and pt would like to know which Dr Garcia would recommend/prefer. He would also like to know where Dr Garcia recommends getting it completed at.    Pt can be reached at [phone]

## 2021-06-23 DIAGNOSIS — I25.10 CORONARY ARTERY DISEASE INVOLVING NATIVE CORONARY ARTERY OF NATIVE HEART WITHOUT ANGINA PECTORIS: Primary | ICD-10-CM

## 2021-06-23 NOTE — TELEPHONE ENCOUNTER
Cristiane pt needs treadmill stress test completed before 7/12/2021 in any office. If there are no openings let me know please.

## 2021-06-23 NOTE — TELEPHONE ENCOUNTER
Pt calling in regards to previous note. Pt states he can walk on a treadmill    Pt can be reached at [phone]

## 2021-07-02 ENCOUNTER — TELEPHONE (OUTPATIENT)
Dept: CARDIOLOGY | Facility: HOSPITAL | Age: 81
End: 2021-07-02

## 2021-07-06 ENCOUNTER — HOSPITAL ENCOUNTER (OUTPATIENT)
Dept: CARDIOLOGY | Facility: HOSPITAL | Age: 81
Discharge: HOME | End: 2021-07-06
Attending: INTERNAL MEDICINE
Payer: MEDICARE

## 2021-07-06 VITALS
RESPIRATION RATE: 16 BRPM | DIASTOLIC BLOOD PRESSURE: 78 MMHG | HEART RATE: 67 BPM | SYSTOLIC BLOOD PRESSURE: 138 MMHG | OXYGEN SATURATION: 97 % | WEIGHT: 181 LBS | BODY MASS INDEX: 25.91 KG/M2 | HEIGHT: 70 IN

## 2021-07-06 DIAGNOSIS — I25.10 CORONARY ARTERY DISEASE INVOLVING NATIVE CORONARY ARTERY OF NATIVE HEART WITHOUT ANGINA PECTORIS: ICD-10-CM

## 2021-07-06 LAB
ASCENDING AORTA: 3.4 CM
BSA FOR ECHO PROCEDURE: 2 M2
EDV (BP): 153 CM3
EF (A4C): 55.2 %
EF A2C: 52.6 %
EJECTION FRACTION: 53.7 %
ESV (BP): 70.9 CM3
LA ESV (BP): 53.4 CM3
LA ESV INDEX (A2C): 26.05 CM3/M2
LA ESV INDEX (BP): 26.7 CM3/M2
LAAS-AP2: 19 CM2
LAAS-AP4: 18 CM2
LALD A4C: 4.97 CM
LALD A4C: 5.52 CM
LAV-S: 52.1 CM3
LEFT ATRIUM VOLUME INDEX: 24.7 CM3/M2
LEFT ATRIUM VOLUME: 49.4 CM3
LEFT VENTRICLE DIASTOLIC VOLUME INDEX: 75.5 CM3/M2
LEFT VENTRICLE DIASTOLIC VOLUME: 151 CM3
LEFT VENTRICLE SYSTOLIC VOLUME INDEX: 33.85 CM3/M2
LEFT VENTRICLE SYSTOLIC VOLUME: 67.7 CM3
LV DIASTOLIC VOLUME: 155 CM3
LV ESV (APICAL 2 CHAMBER): 73.5 CM3
LVAD-AP2: 40.7 CM2
LVAD-AP4: 40.8 CM2
LVAS-AP2: 27 CM2
LVAS-AP4: 25.6 CM2
LVEDVI(A2C): 77.5 CM3/M2
LVEDVI(BP): 76.5 CM3/M2
LVESVI(A2C): 36.75 CM3/M2
LVESVI(BP): 35.45 CM3/M2
LVLD-AP2: 8.95 CM
LVLD-AP4: 9.07 CM
LVLS-AP2: 8.23 CM
LVLS-AP4: 7.97 CM
STRESS ANGINA INDEX: 0
STRESS BASELINE BP: NORMAL MMHG
STRESS BASELINE HR: 61 BPM
STRESS O2 SAT REST: 97 %
STRESS PERCENT HR: 92 %
STRESS POST ESTIMATED WORKLOAD: 11.4 METS
STRESS POST EXERCISE DUR MIN: 9 MIN
STRESS POST EXERCISE DUR SEC: 24 SEC
STRESS POST PEAK BP: NORMAL MMHG
STRESS POST PEAK HR: 130 BPM
STRESS TARGET HR: 121 BPM
TRICUSPID VALVE PEAK REGURGITATION VELOCITY: 2.1 M/S
TV REST PULMONARY ARTERY PRESSURE: 20 MMHG

## 2021-07-06 PROCEDURE — 93016 CV STRESS TEST SUPVJ ONLY: CPT | Performed by: INTERNAL MEDICINE

## 2021-07-06 PROCEDURE — 93351 STRESS TTE COMPLETE: CPT

## 2021-07-06 PROCEDURE — 93018 CV STRESS TEST I&R ONLY: CPT | Performed by: INTERNAL MEDICINE

## 2021-07-06 PROCEDURE — 93350 STRESS TTE ONLY: CPT | Mod: 26 | Performed by: INTERNAL MEDICINE

## 2021-08-10 ENCOUNTER — TRANSCRIBE ORDERS (OUTPATIENT)
Dept: SCHEDULING | Age: 81
End: 2021-08-10

## 2021-08-10 DIAGNOSIS — R06.2 WHEEZING: Primary | ICD-10-CM

## 2021-08-11 ENCOUNTER — HOSPITAL ENCOUNTER (OUTPATIENT)
Dept: RADIOLOGY | Age: 81
Discharge: HOME | End: 2021-08-11
Attending: FAMILY MEDICINE
Payer: MEDICARE

## 2021-08-11 DIAGNOSIS — R06.2 WHEEZING: ICD-10-CM

## 2021-08-11 PROCEDURE — 71250 CT THORAX DX C-: CPT

## 2021-08-16 ENCOUNTER — TRANSCRIBE ORDERS (OUTPATIENT)
Dept: SCHEDULING | Age: 81
End: 2021-08-16

## 2021-08-17 ENCOUNTER — TRANSCRIBE ORDERS (OUTPATIENT)
Dept: SCHEDULING | Age: 81
End: 2021-08-17

## 2021-08-17 DIAGNOSIS — N28.89 OTHER SPECIFIED DISORDERS OF KIDNEY AND URETER: Primary | ICD-10-CM

## 2021-09-15 ENCOUNTER — APPOINTMENT (OUTPATIENT)
Dept: LAB | Facility: HOSPITAL | Age: 81
End: 2021-09-15
Attending: FAMILY MEDICINE
Payer: MEDICARE

## 2021-09-15 ENCOUNTER — TRANSCRIBE ORDERS (OUTPATIENT)
Dept: SCHEDULING | Age: 81
End: 2021-09-15
Payer: MEDICARE

## 2021-09-15 DIAGNOSIS — I48.0 PAROXYSMAL ATRIAL FIBRILLATION (CMS/HCC): ICD-10-CM

## 2021-09-15 DIAGNOSIS — I48.0 PAROXYSMAL ATRIAL FIBRILLATION (CMS/HCC): Primary | ICD-10-CM

## 2021-09-15 PROCEDURE — 82565 ASSAY OF CREATININE: CPT

## 2021-09-15 PROCEDURE — 36415 COLL VENOUS BLD VENIPUNCTURE: CPT

## 2021-09-15 PROCEDURE — 84520 ASSAY OF UREA NITROGEN: CPT

## 2021-09-16 LAB
BUN SERPL-MCNC: 11 MG/DL (ref 8–20)
CREAT SERPL-MCNC: 0.9 MG/DL (ref 0.8–1.3)
GFR SERPL CREATININE-BSD FRML MDRD: >60 ML/MIN/1.73M*2

## 2021-10-12 ENCOUNTER — TRANSCRIBE ORDERS (OUTPATIENT)
Dept: SCHEDULING | Age: 81
End: 2021-10-12
Payer: MEDICARE

## 2021-10-17 DIAGNOSIS — Z23 NEED FOR PROPHYLACTIC VACCINATION AND INOCULATION AGAINST INFLUENZA: ICD-10-CM

## 2021-10-18 RX ORDER — PROPAFENONE HYDROCHLORIDE 425 MG/1
CAPSULE, EXTENDED RELEASE ORAL
Qty: 60 CAPSULE | Refills: 11 | Status: SHIPPED | OUTPATIENT
Start: 2021-10-18 | End: 2022-07-19

## 2021-10-18 RX ORDER — APIXABAN 5 MG/1
TABLET, FILM COATED ORAL
Qty: 60 TABLET | Refills: 11 | Status: SHIPPED | OUTPATIENT
Start: 2021-10-18 | End: 2022-07-19 | Stop reason: SDUPTHER

## 2021-11-10 ENCOUNTER — TRANSCRIBE ORDERS (OUTPATIENT)
Dept: SCHEDULING | Age: 81
End: 2021-11-10
Payer: MEDICARE

## 2021-11-10 DIAGNOSIS — N28.89 OTHER SPECIFIED DISORDERS OF KIDNEY AND URETER: Primary | ICD-10-CM

## 2021-11-15 ENCOUNTER — APPOINTMENT (OUTPATIENT)
Dept: LAB | Facility: HOSPITAL | Age: 81
End: 2021-11-15
Attending: FAMILY MEDICINE
Payer: MEDICARE

## 2021-11-15 ENCOUNTER — TRANSCRIBE ORDERS (OUTPATIENT)
Dept: SCHEDULING | Age: 81
End: 2021-11-15
Payer: MEDICARE

## 2021-11-15 DIAGNOSIS — N28.89 OTHER SPECIFIED DISORDERS OF KIDNEY AND URETER: ICD-10-CM

## 2021-11-15 DIAGNOSIS — N28.89 OTHER SPECIFIED DISORDERS OF KIDNEY AND URETER: Primary | ICD-10-CM

## 2021-11-15 LAB
BUN SERPL-MCNC: 8 MG/DL (ref 8–20)
CREAT SERPL-MCNC: 0.9 MG/DL (ref 0.8–1.3)
GFR SERPL CREATININE-BSD FRML MDRD: >60 ML/MIN/1.73M*2

## 2021-11-15 PROCEDURE — 36415 COLL VENOUS BLD VENIPUNCTURE: CPT

## 2021-11-15 PROCEDURE — 82565 ASSAY OF CREATININE: CPT

## 2021-11-15 PROCEDURE — 84520 ASSAY OF UREA NITROGEN: CPT

## 2021-11-19 ENCOUNTER — HOSPITAL ENCOUNTER (OUTPATIENT)
Dept: RADIOLOGY | Age: 81
Discharge: HOME | End: 2021-11-19
Attending: FAMILY MEDICINE
Payer: MEDICARE

## 2021-11-19 DIAGNOSIS — N28.89 OTHER SPECIFIED DISORDERS OF KIDNEY AND URETER: ICD-10-CM

## 2021-11-19 PROCEDURE — 74178 CT ABD&PLV WO CNTR FLWD CNTR: CPT

## 2021-11-19 PROCEDURE — 63600105 HC IODINE BASED CONTRAST

## 2021-11-19 RX ADMIN — IOHEXOL 125 ML: 350 INJECTION, SOLUTION INTRAVENOUS at 09:03

## 2021-12-16 NOTE — LETTER
Impression: Punctate keratitis, bilateral: H16.143.
- Has tried and failed ATs, PFATs, restasis, Hong Francis - Punctal plugs BLL ultraplug OD 0.4, OS 0.5. Plan: Much improved. Cont preservative free Artificial Tear drops q1-2hr w/a, could not tolerate lubricating lizzy, wull substitute Artificial Tear gel QHS OU. Cont Cequa BID OU. Cont preservative free timolol qAM OU.   
May be seen with dry eye clinic if any problems, cornea prn May 18, 2018     Bridgette Galvez MD  [address] [facility] [place] [address]    Patient: Matthew Rojas   YOB: 1940   Date of Visit: 5/4/2018       Dear Dr. Galvez:    Thank you for referring Matthew Rojas to me for evaluation. Below are my notes for this consultation.    If you have questions, please do not hesitate to call me. I look forward to following your patient along with you.         Sincerely,        Krystian Garcia MD        CC: No Recipients  Krystian Garcia MD  5/18/2018  7:40 PM  Signed       Electrophysiology  Outpatient Progress Note       Reason for visit:   Chief Complaint   Patient presents with   • Atrial Fibrillation   • Atrial Flutter       HPI   Matthew Rojas is a 77 y.o. male who is seen today for follow-up of his atrial arrhythmias. He has a history of atrial flutter and AVNRT both ablated at [facility] in the past. Mr. Rojas underwent Holter monitoring May 2017 that revealed 87% afib with heart rates ranging from  bpm with an average of 79 bpm. He had previously in April 2017 had a QUINCY/cardioversion with resumption of sinus rhythm. He is on propafenone and Eliquis.     At his previous appointment options of treatment where discussed, including antiarrhythmic/drug therapy and catheter ablation.  He opted for higher dose of his propafenone as a first course of treatment.  He does note occasional episodes of mild palpitations, but does not feel he has had any sustained episodes of atrial fibrillation.  He is tolerating the higher dose of Rythmol with no significant side effects.     Past Medical History:   Diagnosis Date   • Abnormal ECG    • Arrhythmia    • Atrial fibrillation and flutter (CMS/HCC) (HCC) 5/3/2018   • AVNRT (AV mayra re-entry tachycardia) (CMS/HCC) (HCC) 5/3/2018   • Current use of long term anticoagulation 5/3/2018   • Long term current use of antiarrhythmic drug 5/3/2018   • Prostate cancer (CMS/HCC) (HCC)      Past Surgical History:    Procedure Laterality Date   • ABLATION OF DYSRHYTHMIC FOCUS     • APPENDECTOMY         Social History   Substance Use Topics   • Smoking status: Former Smoker   • Smokeless tobacco: Never Used   • Alcohol use Yes     No family history on file.    ALLERGY:  Flecainide    Current Outpatient Prescriptions   Medication Sig Dispense Refill   • apixaban (ELIQUIS) 5 mg tablet Take 1 tablet (5 mg total) by mouth 2 (two) times a day. 60 tablet 11   • ascorbic acid (ascorbic acid with ramesh hips) 500 mg tablet Take by mouth daily.     • atorvastatin (LIPITOR) 20 mg tablet Take 20 mg by mouth daily.     • CHOLECALCIFEROL, VITAMIN D3, (VITAMIN D3 ORAL) Take by mouth daily.     • glucosamine sulfate 1,000 mg capsule Take by mouth daily.     • mirabegron (MYRBETRIQ) 50 mg ER tablet Take 50 mg by mouth daily.     • propafenone SR (RYTHMOL SR) 425 mg 12 hr capsule Take 1 capsule (425 mg total) by mouth every 12 (twelve) hours. 60 capsule 11     No current facility-administered medications for this visit.        Review of Systems   Constitution: Negative for weakness and malaise/fatigue.   HENT: Negative for hearing loss.    Eyes: Negative for visual disturbance.   Cardiovascular: Negative for chest pain, dyspnea on exertion, irregular heartbeat, leg swelling, near-syncope, orthopnea, palpitations, paroxysmal nocturnal dyspnea and syncope.   Respiratory: Negative for cough and shortness of breath.    Hematologic/Lymphatic: Negative for bleeding problem.   Skin: Negative for rash.   Musculoskeletal: Negative for joint pain and muscle weakness.   Gastrointestinal: Negative for abdominal pain.   Genitourinary: Negative for hematuria.   Neurological: Negative for focal weakness, paresthesias and tremors.   Psychiatric/Behavioral: Negative for altered mental status.       Objective   Vitals:    05/04/18 1607   BP: 128/70   Pulse: 60   Resp: 16       Physical Exam   Constitutional: He is oriented to person, place, and time. He appears  well-developed and well-nourished.   HENT:   Head: Normocephalic and atraumatic.   Eyes: Conjunctivae are normal. Pupils are equal, round, and reactive to light.   Neck: Normal range of motion.   Cardiovascular: Normal rate, regular rhythm and normal heart sounds.    Pulmonary/Chest: Breath sounds normal. He has no wheezes. He has no rales.   Abdominal: Soft. Bowel sounds are normal. He exhibits no mass. There is no tenderness.   Musculoskeletal: Normal range of motion. He exhibits no edema or deformity.   Neurological: He is alert and oriented to person, place, and time. No cranial nerve deficit.   Skin: Skin is warm and dry. No rash noted.   Psychiatric: He has a normal mood and affect. His behavior is normal.       Lab Results   Component Value Date    WBC 8.58 11/27/2016    HGB 14.5 11/27/2016     11/27/2016    CHOL 224 (H) 03/31/2015    TRIG 135 03/31/2015    HDL 50 03/31/2015    ALT 33 03/31/2015    AST 28 03/31/2015     11/27/2016    K 4.3 11/27/2016    CREATININE 0.9 11/27/2016    TSH 2.22 03/30/2015    INR 1.0 11/27/2016     Cardiovascular Procedures:  Electrophysiology:  Holter (SR with atrial fibrillation) - 5/2017   QUINCY/cardioversion-April 2017  Catheter ablation-at [facility] for AV node reentry and atrial flutter    ECG   Sinus rhythm and within normal limits.    Assessment   Problem List Items Addressed This Visit        Circulatory    Atrial fibrillation and flutter (CMS/HCC) (HCC) - Primary     He is tolerating the increased dose of propafenone at 425 mg SR, twice daily.  His arrhythmia at this time appears to be well controlled and will continue this dosage.  He will continue to be followed closely for any recurrence of arrhythmias.         Relevant Medications    atorvastatin (LIPITOR) 20 mg tablet    apixaban (ELIQUIS) 5 mg tablet    propafenone SR (RYTHMOL SR) 425 mg 12 hr capsule    Other Relevant Orders    ECG 12 lead (Completed)    AVNRT (AV mayra re-entry tachycardia)  (CMS/HCC) (HCC)     He is status post ablation procedure at [facility] in the past.  He has had no symptoms that are suggestive of a recurrence of this tachycardia.         Relevant Medications    atorvastatin (LIPITOR) 20 mg tablet    apixaban (ELIQUIS) 5 mg tablet    propafenone SR (RYTHMOL SR) 425 mg 12 hr capsule       Other    Long term current use of antiarrhythmic drug     He is tolerating the increased dose of propafenone and the electrocardiogram is without abnormalities at today's visit.         Current use of long term anticoagulation     Currently on Eliquis 5 mg twice daily with no adverse effects and no signs of bleeding.                    Krystian Garcia MD  5/18/2018

## 2022-03-10 ENCOUNTER — OFFICE VISIT (OUTPATIENT)
Dept: PHYSICAL MEDICINE AND REHAB | Facility: HOSPITAL | Age: 82
End: 2022-03-10
Attending: SURGERY
Payer: MEDICARE

## 2022-03-10 ENCOUNTER — TRANSCRIBE ORDERS (OUTPATIENT)
Dept: PHYSICAL MEDICINE AND REHAB | Facility: HOSPITAL | Age: 82
End: 2022-03-10

## 2022-03-10 DIAGNOSIS — G56.03 CARPAL TUNNEL SYNDROME, BILATERAL UPPER LIMBS: ICD-10-CM

## 2022-03-10 DIAGNOSIS — G56.03 CARPAL TUNNEL SYNDROME, BILATERAL UPPER LIMBS: Primary | ICD-10-CM

## 2022-03-10 NOTE — PROGRESS NOTES
Electrodiagnostic Report  Name: Matthew Rojas  : 1940  Date of Study: 03/10/22    HPI:   Matthew Rojas is a very pleasant 81 y.o. year old male with PMHx of atrial fibrillation and flutter in addition to AVNRT who presents today with approximately one month history of numbness and tingling in digits 2 and 3 on the right hand.    The symptoms wake him up from sleep at night. He received corticosteroid injection from Dr Yuen and has noted significant relief. He has not particularly noted hand weakness. He denies any history of neck pain or pain shooting down the bilateral upper extremities.     Exam:   Adult male who walks with a normal camille and appears healthy and well. Head is normocephalic. Gaze is conjugate and sclera are anicteric. Overall normal muscle bulk and tone throughout the bilateral uppres with slight thenar atrophy on right. Arthritic changes present on the bilateral CMC, DIP and PIP joints. Slight weakness with thumb abduction on the right compared to left. No hand intrinsic wasting, no weakness with finger abduction. No suspicious lesions on the skin.  Sensation is grossly intact throughout the bilateral upper extremities.    Temperature the peripheral wrist crease was 29.8 °C.  Temperature correction of -0.3 ms was applied.    Electrodiagnostic for:  NCS, sensory:  Left median D2: Onset and peak latencies are normal.  Amplitude is normal.  Conduction loss is normal.  Right median D2: Onset and peak latencies are significantly delayed compared to normal values and the contralateral side.  Amplitude is significantly reduced compared to contralateral side.  Conduction velocity is normal.  Left radial: Onset and peak latencies are normal.  Amplitude is normal.  Conduction velocity is normal.  Right radial: Onset and peak latencies are normal.  Amplitude is normal.  Conduction velocity is normal.  Left ulnar D5: Onset and peak latencies are normal.  Amplitude is normal.  Conduction  velocity is normal.  Right ulnar D5: Onset and peak latencies are normal.  Amplitude is normal.  Conduction velocity is normal.    NCS, motor:  Left median: Distal motor onset latency is normal.  Amplitude is normal and there is no significant decrease of proximal stimulation.  Conduction velocity is normal.  Right median: Distal motor onset latency is prolonged compared to normal values and the contralateral side.  Amplitude is normal and there is no significant decrease of proximal stimulation.  Conduction velocity is normal.  Left ulnar: Distal motor onset latency is normal.  Amplitude is normal and there is no significant decrease proximal stimulation below or above the elbow.  Conduction velocities are normal.  Right ulnar: Distal motor onset latency is normal.  Amplitude is normal and there is no significant decrease of proximal stimulation below or above the elbow.  Conduction velocities are normal.    EMG:  Needle examination was deferred on the left extremity as nerve conduction studies were abnormal.  Needle examination of the right abductor pollicis brevis and right first dorsal interosseous was unrevealing for any abnormal insertional activity or abnormal volitional motor unit action potentials.    Assessment:  1. 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  2. There is no evidence of polyneuropathy  3. There is no evidence of any ulnar nerve neuropathy  4. There is no evidence for any left median nerve neuropathy    Recommendations:  1. The results of the study were discussed with the patient  2. The patient will follow up with his hand surgeon for further recommendations    Thania Childs MD  [facility]  Physical Medicine and Rehabilitation  03/10/22

## 2022-03-11 ENCOUNTER — TELEPHONE (OUTPATIENT)
Dept: SCHEDULING | Facility: CLINIC | Age: 82
End: 2022-03-11
Payer: MEDICARE

## 2022-03-11 NOTE — TELEPHONE ENCOUNTER
Please see task. Patient has appointment with Dr. Garcia March 15th and his surgery is on the 3/17 for your information. sw

## 2022-03-11 NOTE — TELEPHONE ENCOUNTER
Cardiac Clearance     Name of caller: Matthew     Relationship to patient: self    Name of patient: Matthew Rojas    Name of physician: Krystian Garcia MD    Date of Surgery: 3/17/22    Type of Surgery: carpal tunnel     Name of surgeon: Dr Yuen     Office contact number: [phone]    Office fax number: [phone]    Additional notes: Pt is scheduled for 3/15, pt last saw Dr Garcia 9/2020.

## 2022-03-14 ENCOUNTER — TELEPHONE (OUTPATIENT)
Dept: SCHEDULING | Facility: CLINIC | Age: 82
End: 2022-03-14
Payer: MEDICARE

## 2022-03-14 NOTE — TELEPHONE ENCOUNTER
Pt calling to see if he needs to come in tomorrow in order to be cleared.  Pt states he had an echo in July and is inquiring if that is good enough to clear him off of.  Pt can be reached at [phone].

## 2022-03-14 NOTE — TELEPHONE ENCOUNTER
Cancelled patient appointment for March 15th and made him aware he didn't have to keep this appointment after reading your task. sw

## 2022-04-03 ENCOUNTER — TELEPHONE (OUTPATIENT)
Dept: CARDIOLOGY | Facility: CLINIC | Age: 82
End: 2022-04-03
Payer: MEDICARE

## 2022-04-03 RX ORDER — PROPAFENONE HYDROCHLORIDE 225 MG/1
450 CAPSULE, EXTENDED RELEASE ORAL 2 TIMES DAILY
Qty: 4 CAPSULE | Refills: 0 | Status: SHIPPED | OUTPATIENT
Start: 2022-04-03 | End: 2022-07-19

## 2022-04-03 NOTE — TELEPHONE ENCOUNTER
Patient states that he was coming back from [place] and his flight was delayed.  He only has in the propafenone to last him through today.  The pharmacy that he is close to in [place] does not have for 425 mg tablets.  I will send a prescription for 2 tablets of 225 mg of propafenone to take twice a day while he is there, when he gets back he can resume his normal dosing.

## 2022-07-11 ENCOUNTER — TELEPHONE (OUTPATIENT)
Dept: SCHEDULING | Facility: CLINIC | Age: 82
End: 2022-07-11
Payer: MEDICARE

## 2022-07-11 NOTE — TELEPHONE ENCOUNTER
Pt called to cancel 7/15 appt. Scheduled for next available but pt would like to be seen sooner if possible. Appt was added to wait list    Best contact

## 2022-07-19 ENCOUNTER — OFFICE VISIT (OUTPATIENT)
Dept: CARDIOLOGY | Facility: CLINIC | Age: 82
End: 2022-07-19
Payer: MEDICARE

## 2022-07-19 VITALS
HEART RATE: 64 BPM | BODY MASS INDEX: 24.62 KG/M2 | DIASTOLIC BLOOD PRESSURE: 80 MMHG | SYSTOLIC BLOOD PRESSURE: 124 MMHG | RESPIRATION RATE: 16 BRPM | WEIGHT: 172 LBS | HEIGHT: 70 IN

## 2022-07-19 DIAGNOSIS — Z23 NEED FOR PROPHYLACTIC VACCINATION AND INOCULATION AGAINST INFLUENZA: ICD-10-CM

## 2022-07-19 DIAGNOSIS — I48.91 ATRIAL FIBRILLATION AND FLUTTER (CMS/HCC): Primary | ICD-10-CM

## 2022-07-19 DIAGNOSIS — Z79.899 LONG TERM CURRENT USE OF ANTIARRHYTHMIC DRUG: ICD-10-CM

## 2022-07-19 DIAGNOSIS — I47.19 AVNRT (AV NODAL RE-ENTRY TACHYCARDIA) (CMS/HCC): ICD-10-CM

## 2022-07-19 DIAGNOSIS — I48.92 ATRIAL FIBRILLATION AND FLUTTER (CMS/HCC): Primary | ICD-10-CM

## 2022-07-19 DIAGNOSIS — Z79.01 CURRENT USE OF LONG TERM ANTICOAGULATION: ICD-10-CM

## 2022-07-19 PROCEDURE — 99214 OFFICE O/P EST MOD 30 MIN: CPT | Performed by: INTERNAL MEDICINE

## 2022-07-19 PROCEDURE — 93000 ELECTROCARDIOGRAM COMPLETE: CPT | Performed by: INTERNAL MEDICINE

## 2022-07-19 RX ORDER — PROPAFENONE HYDROCHLORIDE 425 MG/1
425 CAPSULE, EXTENDED RELEASE ORAL 2 TIMES DAILY
Qty: 60 CAPSULE | Refills: 11 | Status: SHIPPED | OUTPATIENT
Start: 2022-07-19 | End: 2023-05-19 | Stop reason: SDUPTHER

## 2022-07-19 RX ORDER — PREGABALIN 50 MG/1
50 CAPSULE ORAL AS NEEDED
COMMUNITY
End: 2023-05-25

## 2022-07-19 RX ORDER — ACETAMINOPHEN AND CODEINE PHOSPHATE 300; 30 MG/1; MG/1
1 TABLET ORAL EVERY 4 HOURS PRN
COMMUNITY

## 2022-07-19 NOTE — LETTER
August 11, 2022     Tiana Borjas MD  [address] [place] [address] [facility] [id number]    Patient: Matthew Rojas  YOB: 1940  Date of Visit: 7/19/2022      Dear Dr. Borjas:    Attached is pre-operative evaluation for Matthew Rojas . There is no cardiac contra-indication to his planned procedure.    If you have questions, please do not hesitate to call me. I look forward to following your patient along with you.         Sincerely,        Krystian Garcia MD        CC: Krystian Byrne MD  8/11/2022  5:17 PM  Addendum       Electrophysiology  Outpatient Progress Note       Reason for visit:   Chief Complaint   Patient presents with   • Atrial Fibrillation   • Atrial Flutter       HPI   aMtthew Rojas is a 81 y.o. male who is seen today for follow-up of his atrial arrhythmias. He has a history of atrial flutter and AVNRT both ablated at [facility] in the past. Mr. Rojas underwent Holter monitoring May 2017 that revealed 87% afib with heart rates ranging from  bpm with an average of 79 bpm. He had previously in April 2017 had a QUINCY/cardioversion with resumption of sinus rhythm. He is on propafenone and Eliquis.     He had been having increased symptoms and propafenone was increased. He has since tolerated the higher dose with much less symptoms of AF.  In fact today he reports no recurrences.  He is without significant side effects.  Last week he underwent left right carpal tunnel surgery.      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/2018   • Current use of long term anticoagulation 5/3/2018   • Long term current use of antiarrhythmic drug 5/3/2018   • Prostate cancer (CMS/HCC)      Past Surgical History:   Procedure Laterality Date   • ABLATION OF DYSRHYTHMIC FOCUS     • APPENDECTOMY     • CARPAL TUNNEL  RELEASE  2022    right       Social History     Tobacco Use   • Smoking status: Former Smoker   • Smokeless tobacco: Never Used   Vaping Use   • Vaping Use: Never used   Substance Use Topics   • Alcohol use: Yes     Family History   Problem Relation Age of Onset   • Cancer Biological Mother    • Cancer Biological Father        ALLERGY:  Flecainide    Current Outpatient Medications   Medication Sig Dispense Refill   • acetaminophen-codeine (TYLENOL #3) 300-30 mg per tablet Take 1 tablet by mouth every 4 (four) hours as needed for moderate pain.     • apixaban (ELIQUIS) 5 mg tablet Take 1 tablet (5 mg total) by mouth 2 (two) times a day. 60 tablet 11   • ascorbic acid (VITAMIN C) 500 mg tablet Take by mouth daily.     • atorvastatin (LIPITOR) 20 mg tablet Take 20 mg by mouth daily.     • CHOLECALCIFEROL, VITAMIN D3, (VITAMIN D3 ORAL) Take by mouth daily.     • glucosamine sulfate 1,000 mg capsule Take by mouth daily.     • mirabegron (MYRBETRIQ) 50 mg ER tablet Take 50 mg by mouth daily.     • pregabalin (LYRICA) 50 mg capsule Take 50 mg by mouth as needed.     • propafenone SR (RYTHMOL SR) 425 mg 12 hr capsule Take 1 capsule (425 mg total) by mouth 2 (two) times a day. 60 capsule 11   • traZODone (DESYREL) 50 mg tablet Take by mouth nightly as needed.     • zaleplon (SONATA) 10 mg capsule Take 10 mg by mouth nightly as needed.       No current facility-administered medications for this visit.       Review of Systems   Constitutional: Negative for malaise/fatigue.   HENT: Negative for hearing loss.    Eyes: Negative for visual disturbance.   Cardiovascular: Negative for chest pain, dyspnea on exertion, irregular heartbeat, leg swelling, near-syncope, orthopnea, palpitations, paroxysmal nocturnal dyspnea and syncope.   Respiratory: Negative for cough and shortness of breath.    Hematologic/Lymphatic: Negative for bleeding problem.   Skin: Negative for rash.   Musculoskeletal: Negative for joint pain and muscle  weakness.   Gastrointestinal: Negative for abdominal pain.   Genitourinary: Negative for hematuria.   Neurological: Negative for focal weakness, paresthesias, tremors and weakness.   Psychiatric/Behavioral: Negative for altered mental status.       Objective   Vitals:    07/19/22 1034   BP: 124/80   Pulse: 64   Resp: 16       Physical Exam  Constitutional:       Appearance: He is well-developed.   HENT:      Head: Normocephalic and atraumatic.   Eyes:      Conjunctiva/sclera: Conjunctivae normal.      Pupils: Pupils are equal, round, and reactive to light.   Cardiovascular:      Rate and Rhythm: Normal rate and regular rhythm.      Heart sounds: Normal heart sounds.   Pulmonary:      Breath sounds: Normal breath sounds. No wheezing or rales.   Abdominal:      General: Bowel sounds are normal.      Palpations: Abdomen is soft. There is no mass.      Tenderness: There is no abdominal tenderness.   Musculoskeletal:         General: No deformity. Normal range of motion.      Cervical back: Normal range of motion.   Skin:     General: Skin is warm and dry.      Findings: No rash.   Neurological:      Mental Status: He is alert and oriented to person, place, and time.      Cranial Nerves: No cranial nerve deficit.   Psychiatric:         Behavior: Behavior normal.         Lab Results   Component Value Date    WBC 8.58 11/27/2016    HGB 14.5 11/27/2016     11/27/2016    CHOL 224 (H) 03/31/2015    TRIG 135 03/31/2015    HDL 50 03/31/2015    ALT 33 03/31/2015    AST 28 03/31/2015     11/27/2016    K 4.3 11/27/2016    CREATININE 0.9 11/15/2021    TSH 2.22 03/30/2015    INR 1.0 11/27/2016     Cardiovascular Procedures:  Electrophysiology:  Holter (SR with atrial fibrillation) - 5/2017   QUINCY/cardioversion-April 2017  Catheter ablation-at [facility] for AV node reentry and atrial flutter    ECG   Sinus rhythm and within normal limits.    Assessment   Problem List Items Addressed This Visit        Circulatory     Atrial fibrillation and flutter (CMS/HCC) - Primary     He remains stable on propafenone at 425 mg SR, twice daily.  His arrhythmia at this time appears to be well controlled and we will continue to follow for any recurrence of arrhythmias.           Relevant Medications    propafenone SR (RYTHMOL SR) 425 mg 12 hr capsule    apixaban (ELIQUIS) 5 mg tablet    Other Relevant Orders    ECG 12 LEAD-OFFICE PERFORMED (Completed)    AVNRT (AV mayra re-entry tachycardia) (CMS/HCC)     He is status post ablation procedure at [facility] in the past.  He has had no symptoms that are suggestive of a recurrence of this tachycardia.           Relevant Medications    propafenone SR (RYTHMOL SR) 425 mg 12 hr capsule    apixaban (ELIQUIS) 5 mg tablet    Other Relevant Orders    ECG 12 LEAD-OFFICE PERFORMED (Completed)       Other    Long term current use of antiarrhythmic drug     He is tolerating the increased dose of propafenone and the electrocardiogram is without abnormalities at today's visit.  Since his last visit he has had no known recurrences of atrial arrhythmias.           Relevant Orders    ECG 12 LEAD-OFFICE PERFORMED (Completed)    Current use of long term anticoagulation     Currently on Eliquis 5 mg twice daily with no adverse effects and no signs of bleeding.  Dosing is adequate for age, body weight and creatinine.            Relevant Orders    ECG 12 LEAD-OFFICE PERFORMED (Completed)      Other Visit Diagnoses     Need for prophylactic vaccination and inoculation against influenza        Relevant Medications    propafenone SR (RYTHMOL SR) 425 mg 12 hr capsule               Krystian Garcia MD  7/26/2022 8/11/22 Addendum:    No cardiac contra-indication to his planned cataract procedure.     Noé Garcia

## 2022-07-20 ASSESSMENT — ENCOUNTER SYMPTOMS
ORTHOPNEA: 0
NEAR-SYNCOPE: 0
ALTERED MENTAL STATUS: 0
PALPITATIONS: 0
SYNCOPE: 0
ABDOMINAL PAIN: 0
SHORTNESS OF BREATH: 0
FOCAL WEAKNESS: 0
PARESTHESIAS: 0
COUGH: 0
PND: 0
TREMORS: 0
HEMATURIA: 0
WEAKNESS: 0
DYSPNEA ON EXERTION: 0
IRREGULAR HEARTBEAT: 0

## 2022-07-20 NOTE — PROGRESS NOTES
Electrophysiology  Outpatient Progress Note       Reason for visit:   Chief Complaint   Patient presents with   • Atrial Fibrillation   • Atrial Flutter       HPI   Matthew Rojas is a 81 y.o. male who is seen today for follow-up of his atrial arrhythmias. He has a history of atrial flutter and AVNRT both ablated at [facility] in the past. Mr. Rojas underwent Holter monitoring May 2017 that revealed 87% afib with heart rates ranging from  bpm with an average of 79 bpm. He had previously in April 2017 had a QUINCY/cardioversion with resumption of sinus rhythm. He is on propafenone and Eliquis.     He had been having increased symptoms and propafenone was increased. He has since tolerated the higher dose with much less symptoms of AF.  In fact today he reports no recurrences.  He is without significant side effects.  Last week he underwent left right carpal tunnel surgery.      Past Medical History:   Diagnosis Date   • Abnormal ECG    • Arrhythmia    • Atrial fibrillation and flutter (CMS/HCC) 5/3/2018   • AVNRT (AV mayra re-entry tachycardia) (CMS/[facility]) 5/3/2018   • Current use of long term anticoagulation 5/3/2018   • Long term current use of antiarrhythmic drug 5/3/2018   • Prostate cancer (CMS/HCC)      Past Surgical History:   Procedure Laterality Date   • ABLATION OF DYSRHYTHMIC FOCUS     • APPENDECTOMY     • CARPAL TUNNEL RELEASE  2022    right       Social History     Tobacco Use   • Smoking status: Former Smoker   • Smokeless tobacco: Never Used   Vaping Use   • Vaping Use: Never used   Substance Use Topics   • Alcohol use: Yes     Family History   Problem Relation Age of Onset   • Cancer Biological Mother    • Cancer Biological Father        ALLERGY:  Flecainide    Current Outpatient Medications   Medication Sig Dispense Refill   • acetaminophen-codeine (TYLENOL #3) 300-30 mg per tablet Take 1 tablet by mouth every 4 (four) hours as needed for moderate pain.     • apixaban (ELIQUIS) 5 mg  tablet Take 1 tablet (5 mg total) by mouth 2 (two) times a day. 60 tablet 11   • ascorbic acid (VITAMIN C) 500 mg tablet Take by mouth daily.     • atorvastatin (LIPITOR) 20 mg tablet Take 20 mg by mouth daily.     • CHOLECALCIFEROL, VITAMIN D3, (VITAMIN D3 ORAL) Take by mouth daily.     • glucosamine sulfate 1,000 mg capsule Take by mouth daily.     • mirabegron (MYRBETRIQ) 50 mg ER tablet Take 50 mg by mouth daily.     • pregabalin (LYRICA) 50 mg capsule Take 50 mg by mouth as needed.     • propafenone SR (RYTHMOL SR) 425 mg 12 hr capsule Take 1 capsule (425 mg total) by mouth 2 (two) times a day. 60 capsule 11   • traZODone (DESYREL) 50 mg tablet Take by mouth nightly as needed.     • zaleplon (SONATA) 10 mg capsule Take 10 mg by mouth nightly as needed.       No current facility-administered medications for this visit.       Review of Systems   Constitutional: Negative for malaise/fatigue.   HENT: Negative for hearing loss.    Eyes: Negative for visual disturbance.   Cardiovascular: Negative for chest pain, dyspnea on exertion, irregular heartbeat, leg swelling, near-syncope, orthopnea, palpitations, paroxysmal nocturnal dyspnea and syncope.   Respiratory: Negative for cough and shortness of breath.    Hematologic/Lymphatic: Negative for bleeding problem.   Skin: Negative for rash.   Musculoskeletal: Negative for joint pain and muscle weakness.   Gastrointestinal: Negative for abdominal pain.   Genitourinary: Negative for hematuria.   Neurological: Negative for focal weakness, paresthesias, tremors and weakness.   Psychiatric/Behavioral: Negative for altered mental status.       Objective   Vitals:    07/19/22 1034   BP: 124/80   Pulse: 64   Resp: 16       Physical Exam  Constitutional:       Appearance: He is well-developed.   HENT:      Head: Normocephalic and atraumatic.   Eyes:      Conjunctiva/sclera: Conjunctivae normal.      Pupils: Pupils are equal, round, and reactive to light.   Cardiovascular:       Rate and Rhythm: Normal rate and regular rhythm.      Heart sounds: Normal heart sounds.   Pulmonary:      Breath sounds: Normal breath sounds. No wheezing or rales.   Abdominal:      General: Bowel sounds are normal.      Palpations: Abdomen is soft. There is no mass.      Tenderness: There is no abdominal tenderness.   Musculoskeletal:         General: No deformity. Normal range of motion.      Cervical back: Normal range of motion.   Skin:     General: Skin is warm and dry.      Findings: No rash.   Neurological:      Mental Status: He is alert and oriented to person, place, and time.      Cranial Nerves: No cranial nerve deficit.   Psychiatric:         Behavior: Behavior normal.         Lab Results   Component Value Date    WBC 8.58 11/27/2016    HGB 14.5 11/27/2016     11/27/2016    CHOL 224 (H) 03/31/2015    TRIG 135 03/31/2015    HDL 50 03/31/2015    ALT 33 03/31/2015    AST 28 03/31/2015     11/27/2016    K 4.3 11/27/2016    CREATININE 0.9 11/15/2021    TSH 2.22 03/30/2015    INR 1.0 11/27/2016     Cardiovascular Procedures:  Electrophysiology:  Holter (SR with atrial fibrillation) - 5/2017   QUINCY/cardioversion-April 2017  Catheter ablation-at [facility] for AV node reentry and atrial flutter    ECG   Sinus rhythm and within normal limits.    Assessment   Problem List Items Addressed This Visit        Circulatory    Atrial fibrillation and flutter (CMS/HCC) - Primary     He remains stable on propafenone at 425 mg SR, twice daily.  His arrhythmia at this time appears to be well controlled and we will continue to follow for any recurrence of arrhythmias.           Relevant Medications    propafenone SR (RYTHMOL SR) 425 mg 12 hr capsule    apixaban (ELIQUIS) 5 mg tablet    Other Relevant Orders    ECG 12 LEAD-OFFICE PERFORMED (Completed)    AVNRT (AV mayra re-entry tachycardia) (CMS/HCC)     He is status post ablation procedure at [facility] in the past.  He has had no symptoms that  are suggestive of a recurrence of this tachycardia.           Relevant Medications    propafenone SR (RYTHMOL SR) 425 mg 12 hr capsule    apixaban (ELIQUIS) 5 mg tablet    Other Relevant Orders    ECG 12 LEAD-OFFICE PERFORMED (Completed)       Other    Long term current use of antiarrhythmic drug     He is tolerating the increased dose of propafenone and the electrocardiogram is without abnormalities at today's visit.  Since his last visit he has had no known recurrences of atrial arrhythmias.           Relevant Orders    ECG 12 LEAD-OFFICE PERFORMED (Completed)    Current use of long term anticoagulation     Currently on Eliquis 5 mg twice daily with no adverse effects and no signs of bleeding.  Dosing is adequate for age, body weight and creatinine.            Relevant Orders    ECG 12 LEAD-OFFICE PERFORMED (Completed)      Other Visit Diagnoses     Need for prophylactic vaccination and inoculation against influenza        Relevant Medications    propafenone SR (RYTHMOL SR) 425 mg 12 hr capsule               Krystian Garcia MD  7/26/2022 8/11/22 Addendum:    No cardiac contra-indication to his planned cataract procedure.     Noé Garcia

## 2022-07-20 NOTE — ASSESSMENT & PLAN NOTE
Currently on Eliquis 5 mg twice daily with no adverse effects and no signs of bleeding.  Dosing is adequate for age, body weight and creatinine.

## 2022-07-20 NOTE — ASSESSMENT & PLAN NOTE
He is tolerating the increased dose of propafenone and the electrocardiogram is without abnormalities at today's visit.  Since his last visit he has had no known recurrences of atrial arrhythmias.

## 2022-07-20 NOTE — ASSESSMENT & PLAN NOTE
He is status post ablation procedure at [facility] in the past.  He has had no symptoms that are suggestive of a recurrence of this tachycardia.

## 2022-08-08 ENCOUNTER — TELEPHONE (OUTPATIENT)
Dept: SCHEDULING | Facility: CLINIC | Age: 82
End: 2022-08-08
Payer: MEDICARE

## 2022-08-08 NOTE — TELEPHONE ENCOUNTER
Cardiac Clearance     Name of caller: Sheri     Relationship to patient: Dr Borjas's office      Name of patient: Matthew Rojas    Name of physician: Krystian Garcia MD    Date of Surgery: 9/13/22    Type of Surgery: cataracts     Name of surgeon: Dr Odilon Borjas     Office contact number: [phone], ext [phone]     Office fax number: [phone]    Addendums  Is patient able to be cleared based on last appt on 7/19/22    Additional notes: if addendum is ok, needs to be dated within 30 days of procedure

## 2022-10-07 ENCOUNTER — TRANSCRIBE ORDERS (OUTPATIENT)
Dept: SCHEDULING | Age: 82
End: 2022-10-07

## 2022-12-16 ENCOUNTER — TRANSCRIBE ORDERS (OUTPATIENT)
Dept: RADIOLOGY | Age: 82
End: 2022-12-16

## 2022-12-28 ENCOUNTER — TRANSCRIBE ORDERS (OUTPATIENT)
Dept: RADIOLOGY | Age: 82
End: 2022-12-28

## 2022-12-28 ENCOUNTER — HOSPITAL ENCOUNTER (OUTPATIENT)
Dept: RADIOLOGY | Age: 82
Discharge: HOME | End: 2022-12-28
Attending: FAMILY MEDICINE
Payer: MEDICARE

## 2022-12-28 DIAGNOSIS — M54.50 LOW BACK PAIN: ICD-10-CM

## 2022-12-28 DIAGNOSIS — M25.562 PAIN IN LEFT KNEE: Primary | ICD-10-CM

## 2022-12-28 DIAGNOSIS — M25.562 PAIN IN LEFT KNEE: ICD-10-CM

## 2022-12-28 PROCEDURE — 72110 X-RAY EXAM L-2 SPINE 4/>VWS: CPT

## 2022-12-28 PROCEDURE — 73564 X-RAY EXAM KNEE 4 OR MORE: CPT | Mod: LT

## 2023-04-05 ENCOUNTER — TELEPHONE (OUTPATIENT)
Dept: SCHEDULING | Facility: CLINIC | Age: 83
End: 2023-04-05
Payer: MEDICARE

## 2023-04-05 NOTE — TELEPHONE ENCOUNTER
Pt of Dr Garcia.    Last Office Visit-7/19/22.  Upcoming Office Visit-7/18/23.    PMH-Atrial fibrillation and flutter anticoagulated with Eliquis 5mg BID, AV mayra re entry tachycardia.    Pt called our office today stating for the past week he feels like his heart beat is off. The feeling last minutes, occurs with rest and activity, and comes and goes.    Pt stated the highest is heart rate went was 120. He did not have any other vital signs at this time.    Denies chest pain, dizziness, light headedness, SOB, syncope.    Well hydrated.  Had ETOH last night.  + caffeine. Has coffee every morning.   NO OTC cold remedies.     Pt goes for walks everyday and has been able to tolerate his usual activity.    Pt stated on Monday he missed his evening dose of Eliquis 5mg.     Advised pt on the importance of Eliquis and encouraged him to set reminders to prevent missing future doses. Pt verbalized understanding.    Pt stated he has been taking Propafenone 425mg BID with no missed doses.     By the end of this call patient stated his symptoms improved, however pt would like to discuss them since intermittent.    Patient can be reached at [phone] to discuss above findings.

## 2023-04-05 NOTE — TELEPHONE ENCOUNTER
Pts last office visit 2020. I have asked Shelly Green to find patent an appointment. May be with NP if pt agrees.

## 2023-04-05 NOTE — TELEPHONE ENCOUNTER
Pt called to make an appt wirh Dr Garcia. There are no appts in Epic. Pt is experiencing and having difficulty managing his afib.  Pt was sent to Triage. Pt can be reached at [phone]

## 2023-04-07 ENCOUNTER — TELEPHONE (OUTPATIENT)
Dept: SCHEDULING | Facility: CLINIC | Age: 83
End: 2023-04-07
Payer: MEDICARE

## 2023-04-07 RX ORDER — METOPROLOL TARTRATE 25 MG/1
25 TABLET, FILM COATED ORAL EVERY 6 HOURS PRN
Qty: 90 TABLET | Refills: 1 | Status: SHIPPED | OUTPATIENT
Start: 2023-04-07

## 2023-04-07 NOTE — TELEPHONE ENCOUNTER
Spoke to pt. He is agreeable to starting Metoprolol PRN for Afib.   He will keep his appt with Dr. Garcia next month. He will call us if Metoprolol does not help.

## 2023-04-07 NOTE — TELEPHONE ENCOUNTER
Discussed with Dr. Garcia  He recommends Metoprolol 25mg q 6 hr PRN for episodes of AF. If pt would like to be seen sooner he can be schedule with NP.     I called pt and left VM to call back to confirm he is ok with plan and I will send new script. Will await return call

## 2023-04-07 NOTE — TELEPHONE ENCOUNTER
Patient Name: Matthew Rojas    Caller name: Matthew     Relationship: self     Reason for call: pt called and says he would like a sooner appt    Pt was in afib for about 5 hours last night and is very worried. The highest afib result he saw was around 118    Callback number: [phone]

## 2023-04-07 NOTE — TELEPHONE ENCOUNTER
Spoke to pt who is now back in NSR.  He reports that he has been having increased episodes of Afib over the last week. (See previous encounter)  He reports that he feels OK but does notice his heart racing. Guzman any SOB, lightheadedness. He monitors his HR and rhythm with Lora at home. He takes Propafenine 425mg BID. He has an appt next month but willing to see a NP sooner as he does not want to wait that long. He voices concern that he lives alone and is worried about these increased episodes.   Dr. Garcia- Can we give him a low dose beta blocker? Should his appt be moved up? Please advise

## 2023-05-19 ENCOUNTER — OFFICE VISIT (OUTPATIENT)
Dept: CARDIOLOGY | Facility: CLINIC | Age: 83
End: 2023-05-19
Payer: MEDICARE

## 2023-05-19 VITALS
BODY MASS INDEX: 24.05 KG/M2 | HEART RATE: 63 BPM | DIASTOLIC BLOOD PRESSURE: 78 MMHG | WEIGHT: 168 LBS | SYSTOLIC BLOOD PRESSURE: 126 MMHG | HEIGHT: 70 IN

## 2023-05-19 DIAGNOSIS — Z79.01 CURRENT USE OF LONG TERM ANTICOAGULATION: ICD-10-CM

## 2023-05-19 DIAGNOSIS — I48.91 ATRIAL FIBRILLATION AND FLUTTER (CMS/HCC): Primary | ICD-10-CM

## 2023-05-19 DIAGNOSIS — I47.19 AVNRT (AV NODAL RE-ENTRY TACHYCARDIA) (CMS/HCC): ICD-10-CM

## 2023-05-19 DIAGNOSIS — Z79.899 LONG TERM CURRENT USE OF ANTIARRHYTHMIC DRUG: ICD-10-CM

## 2023-05-19 DIAGNOSIS — I48.92 ATRIAL FIBRILLATION AND FLUTTER (CMS/HCC): Primary | ICD-10-CM

## 2023-05-19 DIAGNOSIS — Z23 NEED FOR PROPHYLACTIC VACCINATION AND INOCULATION AGAINST INFLUENZA: ICD-10-CM

## 2023-05-19 PROCEDURE — 99214 OFFICE O/P EST MOD 30 MIN: CPT | Performed by: INTERNAL MEDICINE

## 2023-05-19 PROCEDURE — 93000 ELECTROCARDIOGRAM COMPLETE: CPT | Performed by: INTERNAL MEDICINE

## 2023-05-19 RX ORDER — PROPAFENONE HYDROCHLORIDE 425 MG/1
425 CAPSULE, EXTENDED RELEASE ORAL 2 TIMES DAILY
Qty: 60 CAPSULE | Refills: 11 | Status: SHIPPED | OUTPATIENT
Start: 2023-05-19 | End: 2024-06-12

## 2023-05-19 NOTE — LETTER
May 25, 2023     Bridgette Galvez MD  [address]  [place] [address]    Patient: Matthew Rojas  YOB: 1940  Date of Visit: 5/19/2023      Dear Dr. Galvez:    Thank you for referring Matthew Rojas to me for evaluation. Below are my notes for this consultation.    If you have questions, please do not hesitate to call me. I look forward to following your patient along with you.         Sincerely,        Krystian Garcia MD        CC: No Recipients    Krystian Garcia MD  5/25/2023  2:26 PM  Signed       Electrophysiology  Outpatient Progress Note       Reason for visit:   Chief Complaint   Patient presents with   • Atrial Fibrillation       HPI   Matthew Rojas is a 82 y.o. male who is seen today for follow-up of his atrial arrhythmias. He has a history of atrial flutter and AVNRT both ablated at [facility] in the past. Mr. Rojas underwent Holter monitoring May 2017 that revealed 87% afib with heart rates ranging from  bpm with an average of 79 bpm. He had previously in April 2017 had a QUINCY/cardioversion with resumption of sinus rhythm. He is on propafenone and Eliquis.     Since he was last seen has been doing well.  He has had a couple episodes where he notes his heart rate is increased, at about 160 bpm.  When he sees this occur he takes metoprolol and usually within an hour the heart rate is back to normal.  Other than perhaps some mild sensation of palpitations he has no symptoms associated with the arrhythmia.    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/2018   • Current use of long term anticoagulation 5/3/2018   • Long term current use of antiarrhythmic drug 5/3/2018   • Prostate cancer (CMS/HCC)      Past Surgical History:   Procedure Laterality Date   • ABLATION OF DYSRHYTHMIC FOCUS     • APPENDECTOMY     • CARPAL TUNNEL  RELEASE  2022    right       Social History     Tobacco Use   • Smoking status: Former   • Smokeless tobacco: Never   Vaping Use   • Vaping status: Never Used   Substance Use Topics   • Alcohol use: Yes     Family History   Problem Relation Age of Onset   • Cancer Biological Mother    • Cancer Biological Father        ALLERGY:  Flecainide    Current Outpatient Medications   Medication Sig Dispense Refill   • acetaminophen-codeine (TYLENOL #3) 300-30 mg per tablet Take 1 tablet by mouth every 4 (four) hours as needed for moderate pain.     • apixaban (ELIQUIS) 5 mg tablet Take 1 tablet (5 mg total) by mouth 2 (two) times a day. 60 tablet 11   • ascorbic acid (VITAMIN C) 500 mg tablet Take by mouth daily.     • atorvastatin (LIPITOR) 20 mg tablet Take 20 mg by mouth daily.     • CHOLECALCIFEROL, VITAMIN D3, (VITAMIN D3 ORAL) Take by mouth daily.     • glucosamine sulfate 1,000 mg capsule Take by mouth daily.     • metoprolol tartrate (LOPRESSOR) 25 mg tablet Take 1 tablet (25 mg total) by mouth every 6 (six) hours as needed (for atrial fibrillation). 90 tablet 1   • mirabegron (MYRBETRIQ) 50 mg ER tablet Take 50 mg by mouth daily.     • propafenone SR (RYTHMOL SR) 425 mg 12 hr capsule Take 1 capsule (425 mg total) by mouth 2 (two) times a day. 60 capsule 11   • traZODone (DESYREL) 50 mg tablet Take by mouth nightly as needed.     • zaleplon (SONATA) 10 mg capsule Take 10 mg by mouth nightly as needed.     • pregabalin (LYRICA) 50 mg capsule Take 50 mg by mouth as needed.       No current facility-administered medications for this visit.       Review of Systems   Constitutional: Negative for malaise/fatigue.   HENT: Negative for hearing loss.    Eyes: Negative for visual disturbance.   Cardiovascular: Negative for chest pain, dyspnea on exertion, irregular heartbeat, leg swelling, near-syncope, orthopnea, palpitations, paroxysmal nocturnal dyspnea and syncope.   Respiratory: Negative for cough and shortness of breath.     Hematologic/Lymphatic: Negative for bleeding problem.   Skin: Negative for rash.   Musculoskeletal: Negative for joint pain and muscle weakness.   Gastrointestinal: Negative for abdominal pain.   Genitourinary: Negative for hematuria.   Neurological: Negative for focal weakness, paresthesias, tremors and weakness.   Psychiatric/Behavioral: Negative for altered mental status.       Objective   Vitals:    05/19/23 1211   BP: 126/78   Pulse: 63       Physical Exam  Constitutional:       Appearance: He is well-developed.   HENT:      Head: Normocephalic and atraumatic.   Eyes:      Conjunctiva/sclera: Conjunctivae normal.      Pupils: Pupils are equal, round, and reactive to light.   Cardiovascular:      Rate and Rhythm: Normal rate and regular rhythm.      Heart sounds: Normal heart sounds.   Pulmonary:      Breath sounds: Normal breath sounds. No wheezing or rales.   Abdominal:      General: Bowel sounds are normal.      Palpations: Abdomen is soft. There is no mass.      Tenderness: There is no abdominal tenderness.   Musculoskeletal:         General: No deformity. Normal range of motion.      Cervical back: Normal range of motion.   Skin:     General: Skin is warm and dry.      Findings: No rash.   Neurological:      Mental Status: He is alert and oriented to person, place, and time.      Cranial Nerves: No cranial nerve deficit.   Psychiatric:         Behavior: Behavior normal.         Lab Results   Component Value Date    WBC 8.58 11/27/2016    HGB 14.5 11/27/2016     11/27/2016    CHOL 224 (H) 03/31/2015    TRIG 135 03/31/2015    HDL 50 03/31/2015    ALT 33 03/31/2015    AST 28 03/31/2015     11/27/2016    K 4.3 11/27/2016    CREATININE 0.9 11/15/2021    TSH 2.22 03/30/2015    INR 1.0 11/27/2016     Cardiovascular Procedures:  Electrophysiology:  Holter (SR with atrial fibrillation) - 5/2017   QUINCY/cardioversion-April 2017  Catheter ablation-at [facility] for AV node reentry and atrial  flutter    ECG   Sinus rhythm and within normal limits.    Assessment   Problem List Items Addressed This Visit        Circulatory    Atrial fibrillation and flutter (CMS/HCC) - Primary     He remains stable on propafenone at 425 mg SR, twice daily.  He has had no symptomatic sustained episodes of atrial fibrillation, is unclear if the occasional elevated heart rate is A-fib or a mild atrial arrhythmia.  Overall his arrhythmia at this time appears to be well controlled and we will continue current medications.          Relevant Medications    propafenone SR (RYTHMOL SR) 425 mg 12 hr capsule    apixaban (ELIQUIS) 5 mg tablet    Other Relevant Orders    ECG 12 LEAD-OFFICE PERFORMED (Completed)    AVNRT (AV mayra re-entry tachycardia) (CMS/HCC)     He is status post ablation procedure at [facility] in the past.  He has had no symptoms that are suggestive of a recurrence of this tachycardia.         Relevant Medications    propafenone SR (RYTHMOL SR) 425 mg 12 hr capsule    apixaban (ELIQUIS) 5 mg tablet       Other    Long term current use of antiarrhythmic drug     He is tolerating the increased dose of propafenone and the electrocardiogram is without abnormalities at today's visit.           Current use of long term anticoagulation     No signs or symptoms of bleeding.        Other Visit Diagnoses     Need for prophylactic vaccination and inoculation against influenza        Relevant Medications    propafenone SR (RYTHMOL SR) 425 mg 12 hr capsule              Krystian Garcia MD  05/19/2023

## 2023-05-25 ASSESSMENT — ENCOUNTER SYMPTOMS
HEMATURIA: 0
FOCAL WEAKNESS: 0
PARESTHESIAS: 0
TREMORS: 0
ABDOMINAL PAIN: 0
ALTERED MENTAL STATUS: 0
DYSPNEA ON EXERTION: 0
SHORTNESS OF BREATH: 0
PND: 0
WEAKNESS: 0
PALPITATIONS: 0
COUGH: 0
SYNCOPE: 0
ORTHOPNEA: 0
IRREGULAR HEARTBEAT: 0
NEAR-SYNCOPE: 0

## 2023-05-25 NOTE — PROGRESS NOTES
Electrophysiology  Outpatient Progress Note       Reason for visit:   Chief Complaint   Patient presents with   • Atrial Fibrillation       HPI   Matthew Rojas is a 82 y.o. male who is seen today for follow-up of his atrial arrhythmias. He has a history of atrial flutter and AVNRT both ablated at [facility] in the past. Mr. Rojas underwent Holter monitoring May 2017 that revealed 87% afib with heart rates ranging from  bpm with an average of 79 bpm. He had previously in April 2017 had a QUINCY/cardioversion with resumption of sinus rhythm. He is on propafenone and Eliquis.     Since he was last seen has been doing well.  He has had a couple episodes where he notes his heart rate is increased, at about 160 bpm.  When he sees this occur he takes metoprolol and usually within an hour the heart rate is back to normal.  Other than perhaps some mild sensation of palpitations he has no symptoms associated with the arrhythmia.    Past Medical History:   Diagnosis Date   • Abnormal ECG    • Arrhythmia    • Atrial fibrillation and flutter (CMS/[facility]) 5/3/2018   • AVNRT (AV mayra re-entry tachycardia) (CMS/[facility]) 5/3/2018   • Current use of long term anticoagulation 5/3/2018   • Long term current use of antiarrhythmic drug 5/3/2018   • Prostate cancer (CMS/[facility])      Past Surgical History:   Procedure Laterality Date   • ABLATION OF DYSRHYTHMIC FOCUS     • APPENDECTOMY     • CARPAL TUNNEL RELEASE  2022    right       Social History     Tobacco Use   • Smoking status: Former   • Smokeless tobacco: Never   Vaping Use   • Vaping status: Never Used   Substance Use Topics   • Alcohol use: Yes     Family History   Problem Relation Age of Onset   • Cancer Biological Mother    • Cancer Biological Father        ALLERGY:  Flecainide    Current Outpatient Medications   Medication Sig Dispense Refill   • acetaminophen-codeine (TYLENOL #3) 300-30 mg per tablet Take 1 tablet by mouth every 4 (four) hours as needed for  moderate pain.     • apixaban (ELIQUIS) 5 mg tablet Take 1 tablet (5 mg total) by mouth 2 (two) times a day. 60 tablet 11   • ascorbic acid (VITAMIN C) 500 mg tablet Take by mouth daily.     • atorvastatin (LIPITOR) 20 mg tablet Take 20 mg by mouth daily.     • CHOLECALCIFEROL, VITAMIN D3, (VITAMIN D3 ORAL) Take by mouth daily.     • glucosamine sulfate 1,000 mg capsule Take by mouth daily.     • metoprolol tartrate (LOPRESSOR) 25 mg tablet Take 1 tablet (25 mg total) by mouth every 6 (six) hours as needed (for atrial fibrillation). 90 tablet 1   • mirabegron (MYRBETRIQ) 50 mg ER tablet Take 50 mg by mouth daily.     • propafenone SR (RYTHMOL SR) 425 mg 12 hr capsule Take 1 capsule (425 mg total) by mouth 2 (two) times a day. 60 capsule 11   • traZODone (DESYREL) 50 mg tablet Take by mouth nightly as needed.     • zaleplon (SONATA) 10 mg capsule Take 10 mg by mouth nightly as needed.     • pregabalin (LYRICA) 50 mg capsule Take 50 mg by mouth as needed.       No current facility-administered medications for this visit.       Review of Systems   Constitutional: Negative for malaise/fatigue.   HENT: Negative for hearing loss.    Eyes: Negative for visual disturbance.   Cardiovascular: Negative for chest pain, dyspnea on exertion, irregular heartbeat, leg swelling, near-syncope, orthopnea, palpitations, paroxysmal nocturnal dyspnea and syncope.   Respiratory: Negative for cough and shortness of breath.    Hematologic/Lymphatic: Negative for bleeding problem.   Skin: Negative for rash.   Musculoskeletal: Negative for joint pain and muscle weakness.   Gastrointestinal: Negative for abdominal pain.   Genitourinary: Negative for hematuria.   Neurological: Negative for focal weakness, paresthesias, tremors and weakness.   Psychiatric/Behavioral: Negative for altered mental status.       Objective   Vitals:    05/19/23 1211   BP: 126/78   Pulse: 63       Physical Exam  Constitutional:       Appearance: He is well-developed.    HENT:      Head: Normocephalic and atraumatic.   Eyes:      Conjunctiva/sclera: Conjunctivae normal.      Pupils: Pupils are equal, round, and reactive to light.   Cardiovascular:      Rate and Rhythm: Normal rate and regular rhythm.      Heart sounds: Normal heart sounds.   Pulmonary:      Breath sounds: Normal breath sounds. No wheezing or rales.   Abdominal:      General: Bowel sounds are normal.      Palpations: Abdomen is soft. There is no mass.      Tenderness: There is no abdominal tenderness.   Musculoskeletal:         General: No deformity. Normal range of motion.      Cervical back: Normal range of motion.   Skin:     General: Skin is warm and dry.      Findings: No rash.   Neurological:      Mental Status: He is alert and oriented to person, place, and time.      Cranial Nerves: No cranial nerve deficit.   Psychiatric:         Behavior: Behavior normal.         Lab Results   Component Value Date    WBC 8.58 11/27/2016    HGB 14.5 11/27/2016     11/27/2016    CHOL 224 (H) 03/31/2015    TRIG 135 03/31/2015    HDL 50 03/31/2015    ALT 33 03/31/2015    AST 28 03/31/2015     11/27/2016    K 4.3 11/27/2016    CREATININE 0.9 11/15/2021    TSH 2.22 03/30/2015    INR 1.0 11/27/2016     Cardiovascular Procedures:  Electrophysiology:  Holter (SR with atrial fibrillation) - 5/2017   QUINCY/cardioversion-April 2017  Catheter ablation-at [facility] for AV node reentry and atrial flutter    ECG   Sinus rhythm and within normal limits.    Assessment   Problem List Items Addressed This Visit        Circulatory    Atrial fibrillation and flutter (CMS/HCC) - Primary     He remains stable on propafenone at 425 mg SR, twice daily.  He has had no symptomatic sustained episodes of atrial fibrillation, is unclear if the occasional elevated heart rate is A-fib or a mild atrial arrhythmia.  Overall his arrhythmia at this time appears to be well controlled and we will continue current medications.           Relevant Medications    propafenone SR (RYTHMOL SR) 425 mg 12 hr capsule    apixaban (ELIQUIS) 5 mg tablet    Other Relevant Orders    ECG 12 LEAD-OFFICE PERFORMED (Completed)    AVNRT (AV mayra re-entry tachycardia) (CMS/HCC)     He is status post ablation procedure at [facility] in the past.  He has had no symptoms that are suggestive of a recurrence of this tachycardia.         Relevant Medications    propafenone SR (RYTHMOL SR) 425 mg 12 hr capsule    apixaban (ELIQUIS) 5 mg tablet       Other    Long term current use of antiarrhythmic drug     He is tolerating the increased dose of propafenone and the electrocardiogram is without abnormalities at today's visit.           Current use of long term anticoagulation     No signs or symptoms of bleeding.        Other Visit Diagnoses     Need for prophylactic vaccination and inoculation against influenza        Relevant Medications    propafenone SR (RYTHMOL SR) 425 mg 12 hr capsule               Krystian Garcia MD  05/19/2023

## 2023-10-27 ENCOUNTER — TRANSCRIBE ORDERS (OUTPATIENT)
Dept: SCHEDULING | Age: 83
End: 2023-10-27

## 2023-10-27 DIAGNOSIS — R91.1 SOLITARY PULMONARY NODULE: Primary | ICD-10-CM

## 2023-11-08 ENCOUNTER — HOSPITAL ENCOUNTER (OUTPATIENT)
Dept: RADIOLOGY | Age: 83
Discharge: HOME | End: 2023-11-08
Attending: FAMILY MEDICINE
Payer: MEDICARE

## 2023-11-08 DIAGNOSIS — R91.1 SOLITARY PULMONARY NODULE: ICD-10-CM

## 2023-11-08 PROCEDURE — 71250 CT THORAX DX C-: CPT

## 2024-05-31 NOTE — ASSESSMENT & PLAN NOTE
He is status post ablation procedure at [facility] in the past.  He has had no symptoms that are suggestive of a recurrence of this tachycardia.  
He is tolerating the increased dose of propafenone and the electrocardiogram is without abnormalities at today's visit.    
He remains stable on propafenone at 425 mg SR, twice daily.  He has had no symptomatic sustained episodes of atrial fibrillation, is unclear if the occasional elevated heart rate is A-fib or a mild atrial arrhythmia.  Overall his arrhythmia at this time appears to be well controlled and we will continue current medications.   
No signs or symptoms of bleeding.  
Never smoker

## 2024-06-01 ENCOUNTER — TRANSCRIBE ORDERS (OUTPATIENT)
Dept: SCHEDULING | Age: 84
End: 2024-06-01

## 2024-06-01 DIAGNOSIS — S92.243A: Primary | ICD-10-CM

## 2024-07-05 ENCOUNTER — TELEPHONE (OUTPATIENT)
Dept: CARDIOTHORACIC SURGERY | Facility: CLINIC | Age: 84
End: 2024-07-05
Payer: MEDICARE

## 2024-07-09 ENCOUNTER — OFFICE VISIT (OUTPATIENT)
Dept: CARDIOLOGY | Facility: CLINIC | Age: 84
End: 2024-07-09
Payer: MEDICARE

## 2024-07-09 VITALS
SYSTOLIC BLOOD PRESSURE: 130 MMHG | WEIGHT: 164 LBS | DIASTOLIC BLOOD PRESSURE: 82 MMHG | HEART RATE: 66 BPM | BODY MASS INDEX: 23.48 KG/M2 | HEIGHT: 70 IN

## 2024-07-09 DIAGNOSIS — I48.92 ATRIAL FIBRILLATION AND FLUTTER (CMS/HCC): Primary | ICD-10-CM

## 2024-07-09 DIAGNOSIS — Z79.899 LONG TERM CURRENT USE OF ANTIARRHYTHMIC DRUG: ICD-10-CM

## 2024-07-09 DIAGNOSIS — Z79.01 CURRENT USE OF LONG TERM ANTICOAGULATION: ICD-10-CM

## 2024-07-09 DIAGNOSIS — I48.91 ATRIAL FIBRILLATION AND FLUTTER (CMS/HCC): Primary | ICD-10-CM

## 2024-07-09 DIAGNOSIS — I47.19 AVNRT (AV NODAL RE-ENTRY TACHYCARDIA) (CMS/HCC): ICD-10-CM

## 2024-07-09 PROCEDURE — 99214 OFFICE O/P EST MOD 30 MIN: CPT | Performed by: INTERNAL MEDICINE

## 2024-07-09 PROCEDURE — 93000 ELECTROCARDIOGRAM COMPLETE: CPT | Performed by: INTERNAL MEDICINE

## 2024-07-09 RX ORDER — PROPAFENONE HYDROCHLORIDE 425 MG/1
425 CAPSULE, EXTENDED RELEASE ORAL 2 TIMES DAILY
Qty: 60 CAPSULE | Refills: 11 | Status: SHIPPED | OUTPATIENT
Start: 2024-07-09 | End: 2025-07-04

## 2024-07-09 ASSESSMENT — ENCOUNTER SYMPTOMS
HEMATURIA: 0
SYNCOPE: 0
ABDOMINAL PAIN: 0
IRREGULAR HEARTBEAT: 0
COUGH: 0
WEAKNESS: 0
FOCAL WEAKNESS: 0
PND: 0
PARESTHESIAS: 0
ORTHOPNEA: 0
ALTERED MENTAL STATUS: 0
TREMORS: 0
DYSPNEA ON EXERTION: 0
PALPITATIONS: 0
SHORTNESS OF BREATH: 0
NEAR-SYNCOPE: 0

## 2024-07-09 NOTE — ASSESSMENT & PLAN NOTE
He is tolerating the increased dose of propafenone and the electrocardiogram is without abnormalities at today's visit.  We will consider a stress test for his next appointment to ensure no proarrhythmia or provokable ischemia.

## 2024-07-09 NOTE — PROGRESS NOTES
Electrophysiology  Outpatient Progress Note       Reason for visit:   Chief Complaint   Patient presents with    Atrial Fibrillation       HPI   Matthew Rojas is a 83 y.o. male who is seen today for follow-up of his atrial arrhythmias.  He has a history of atrial flutter and AVNRT both ablated at [facility] in the past. Mr. Rojas underwent Holter monitoring May 2017 that revealed 87% afib with heart rates ranging from  bpm with an average of 79 bpm. He had previously in April 2017 had a QUINCY/cardioversion with resumption of sinus rhythm. He is on propafenone and Eliquis.     Patient comes today to the office for a follow-up, he stated that he has been in a good shape performing his daily activities and is able to walk 3 to 4 miles at least 5 times a week with no particular complaints.  He claims compliant of his current cardiovascular medications including propafenone and Eliquis.    Since the last visit he had a year ago he has had 3 episodes of atrial fibrillation that lasted less than 2 hours and they were successfully treated with an extra dose of metoprolol.    Past Medical History:   Diagnosis Date    Abnormal ECG     Arrhythmia     Atrial fibrillation and flutter (CMS/[facility]) 5/3/2018    AVNRT (AV mayra re-entry tachycardia) (CMS/[facility]) 5/3/2018    Current use of long term anticoagulation 5/3/2018    Long term current use of antiarrhythmic drug 5/3/2018    Prostate cancer (CMS/[facility])      Past Surgical History:   Procedure Laterality Date    ABLATION OF DYSRHYTHMIC FOCUS      APPENDECTOMY      CARPAL TUNNEL RELEASE  2022    right       Social History     Tobacco Use    Smoking status: Former    Smokeless tobacco: Never   Vaping Use    Vaping Use: Never used   Substance Use Topics    Alcohol use: Yes     Family History   Problem Relation Age of Onset    Cancer Biological Mother     Cancer Biological Father        ALLERGY:  Flecainide    Current Outpatient Medications   Medication Sig Dispense  Refill    apixaban (ELIQUIS) 5 mg tablet Take 1 tablet (5 mg total) by mouth 2 (two) times a day. Please schedule an appointment for further refills. 60 tablet 11    ascorbic acid (VITAMIN C) 500 mg tablet Take by mouth daily.      atorvastatin (LIPITOR) 20 mg tablet Take 20 mg by mouth daily.      CHOLECALCIFEROL, VITAMIN D3, (VITAMIN D3 ORAL) Take by mouth daily.      glucosamine sulfate 1,000 mg capsule Take by mouth daily.      mirabegron (MYRBETRIQ) 50 mg ER tablet Take 50 mg by mouth daily.      propafenone SR (RYTHMOL SR) 425 mg 12 hr capsule Take 1 capsule (425 mg total) by mouth 2 (two) times a day. Please schedule an appointment for further refills. 60 capsule 11    traZODone (DESYREL) 50 mg tablet Take by mouth nightly as needed.      acetaminophen-codeine (TYLENOL #3) 300-30 mg per tablet Take 1 tablet by mouth every 4 (four) hours as needed for moderate pain.      metoprolol tartrate (LOPRESSOR) 25 mg tablet Take 1 tablet (25 mg total) by mouth every 6 (six) hours as needed (for atrial fibrillation). 90 tablet 1     No current facility-administered medications for this visit.       Review of Systems   Constitutional: Negative for malaise/fatigue.   HENT:  Negative for hearing loss.    Eyes:  Negative for visual disturbance.   Cardiovascular:  Negative for chest pain, dyspnea on exertion, irregular heartbeat, leg swelling, near-syncope, orthopnea, palpitations, paroxysmal nocturnal dyspnea and syncope.   Respiratory:  Negative for cough and shortness of breath.    Hematologic/Lymphatic: Negative for bleeding problem.   Skin:  Negative for rash.   Musculoskeletal:  Negative for joint pain and muscle weakness.   Gastrointestinal:  Negative for abdominal pain.   Genitourinary:  Negative for hematuria.   Neurological:  Negative for focal weakness, paresthesias, tremors and weakness.   Psychiatric/Behavioral:  Negative for altered mental status.        Objective   Vitals:    07/09/24 1607   BP: 130/82    Pulse: 66         Physical Exam  Constitutional:       Appearance: He is well-developed.   HENT:      Head: Normocephalic and atraumatic.   Eyes:      Conjunctiva/sclera: Conjunctivae normal.      Pupils: Pupils are equal, round, and reactive to light.   Cardiovascular:      Rate and Rhythm: Normal rate and regular rhythm.      Heart sounds: Normal heart sounds.   Pulmonary:      Breath sounds: Normal breath sounds. No wheezing or rales.   Abdominal:      General: Bowel sounds are normal.      Palpations: Abdomen is soft. There is no mass.      Tenderness: There is no abdominal tenderness.   Musculoskeletal:         General: No deformity. Normal range of motion.      Cervical back: Normal range of motion.   Skin:     General: Skin is warm and dry.      Findings: No rash.   Neurological:      Mental Status: He is alert and oriented to person, place, and time.      Cranial Nerves: No cranial nerve deficit.   Psychiatric:         Behavior: Behavior normal.         Lab Results   Component Value Date    WBC 8.58 11/27/2016    HGB 14.5 11/27/2016     11/27/2016    CHOL 224 (H) 03/31/2015    TRIG 135 03/31/2015    HDL 50 03/31/2015    ALT 33 03/31/2015    AST 28 03/31/2015     11/27/2016    K 4.3 11/27/2016    CREATININE 0.9 11/15/2021    TSH 2.22 03/30/2015    INR 1.0 11/27/2016     Cardiovascular Procedures:  Electrophysiology:  Holter (SR with atrial fibrillation) - 5/2017   QUINCY/cardioversion-April 2017  Catheter ablation-at [facility] for AV node reentry and atrial flutter    ECG   Sinus rhythm and within normal limits.    Assessment   Problem List Items Addressed This Visit          Circulatory    Atrial fibrillation and flutter (CMS/HCC) - Primary     He remains stable on propafenone at 425 mg SR, twice daily.  The patient has had minimal episodes of atrial fibrillation for the last year.  He is able to perform his daily activities and exercise with no particular issues.  Overall he is  arrhythmia seems to be well-controlled.  We will continue with his current cardiovascular medications.  We will consider a stress test for his next appointment.           Relevant Medications    apixaban (ELIQUIS) 5 mg tablet    propafenone SR (RYTHMOL SR) 425 mg 12 hr capsule    Other Relevant Orders    ECG 12 LEAD-OFFICE PERFORMED (Completed)    AVNRT (AV mayra re-entry tachycardia) (CMS/HCC)     He is status post ablation procedure at [facility] in the past.  He has had no symptoms that are suggestive of a recurrence of this tachycardia.         Relevant Medications    apixaban (ELIQUIS) 5 mg tablet    propafenone SR (RYTHMOL SR) 425 mg 12 hr capsule       Other    Long term current use of antiarrhythmic drug     He is tolerating the increased dose of propafenone and the electrocardiogram is without abnormalities at today's visit.  We will consider a stress test for his next appointment to ensure no proarrhythmia or provokable ischemia.          Current use of long term anticoagulation     No signs or symptoms of bleeding.                 Krystian Garcia MD  07/09/2024

## 2024-07-09 NOTE — ASSESSMENT & PLAN NOTE
He remains stable on propafenone at 425 mg SR, twice daily.  The patient has had minimal episodes of atrial fibrillation for the last year.  He is able to perform his daily activities and exercise with no particular issues.  Overall he is arrhythmia seems to be well-controlled.  We will continue with his current cardiovascular medications.  We will consider a stress test for his next appointment.

## 2024-07-09 NOTE — ASSESSMENT & PLAN NOTE
He is status post ablation procedure at [facility] in the past.  He has had no symptoms that are suggestive of a recurrence of this tachycardia.

## 2024-07-15 ENCOUNTER — HOSPITAL ENCOUNTER (OUTPATIENT)
Dept: RADIOLOGY | Age: 84
Discharge: HOME | End: 2024-07-15
Attending: PODIATRIST
Payer: MEDICARE

## 2024-07-15 DIAGNOSIS — S92.243A: ICD-10-CM

## 2024-10-31 ENCOUNTER — TELEPHONE (OUTPATIENT)
Dept: SCHEDULING | Facility: CLINIC | Age: 84
End: 2024-10-31
Payer: MEDICARE

## 2024-10-31 NOTE — TELEPHONE ENCOUNTER
Medication Clarification     Name of caller: Rena     Relationship to patient: Miguel Ángel Wong     Name of patient: Matthew Rojas    Name of physician: Krystian Garcia MD    Name of medication: Eliquis     What needs to be clarified: Rena Faxed Form for Eliquis Instructions Yesterday, Rena needs provider to fill out nad fax back for upcoming procedure.     Best contact number: [phone]

## 2025-02-25 ENCOUNTER — TELEPHONE (OUTPATIENT)
Dept: SCHEDULING | Facility: CLINIC | Age: 85
End: 2025-02-25
Payer: MEDICARE

## 2025-02-25 NOTE — TELEPHONE ENCOUNTER
[facility]- Clearance request for malignant melanoma excision scheduled for 2/28/2025.  Patient has a history of AVNRT s/p ablation and PAF on propafenone and Eliquis.  LOV  7/9/2024 with EKG showing sinus rhythm.  Echo stress in July 2021 was negative for ischemia showed an EF 54%.  Okay to send letter with holding Eliquis 48 hours prior to procedure?

## 2025-02-25 NOTE — TELEPHONE ENCOUNTER
Cardiac Clearance     Name of caller: joaquín     Relationship to patient: [facility]     Name of patient: Matthew Rojas    Insurance Name: medicare     Name of physician: Krystian Garcia MD    Date of Procedure/Surgery: 02/28    Type of Procedure/Surgery: Melonona     Name of surgeon: Mercedes Barron     Office contact number: [phone]    Office fax number: [phone]    Addendums  Is patient able to be cleared based on last appt on 07/09  If unable to clear patient, please contact patient at [phone]

## 2025-05-14 ENCOUNTER — TELEPHONE (OUTPATIENT)
Dept: SCHEDULING | Facility: CLINIC | Age: 85
End: 2025-05-14

## 2025-05-14 NOTE — TELEPHONE ENCOUNTER
Ronnie Sauer. PCP is requesting that patient come in to see Dr. Garcia for an office visit due to increase in symptoms of shortness of breath. Is there any openings on his schedule over the next few weeks? Thank you!

## 2025-05-14 NOTE — TELEPHONE ENCOUNTER
Pt saw PCP Dr. Galvez due to SOB upon little exertion. He was advised to follow up with Dr. Garcia to get some testing. Please reach pt at [phone]

## 2025-05-17 NOTE — H&P (VIEW-ONLY)
Electrophysiology  Outpatient Progress Note       Reason for visit: Follow up     HPI   Matthew Rojas is a 84 y.o. male who is seen today for follow-up of his atrial arrhythmias.  He has a history of atrial flutter and AVNRT both ablated at [facility] in the past. Mr. Rojas underwent Holter monitoring May 2017 that revealed 87% afib with heart rates ranging from  bpm with an average of 79 bpm. He had previously in April 2017 had a QUINCY/cardioversion with resumption of sinus rhythm. He is on propafenone and Eliquis.     Patient comes today to the office for a follow-up, he stated that he has been in a good shape performing his daily activities and is able to walk 3 to 4 miles.  He has been playing catch with football and baseball and hockey with his 8-year-old grandson and has experienced some shortness of breath.  He claims compliant of his current cardiovascular medications including propafenone and Eliquis.    Since the last visit he had a year ago he occasionally gets heart palpitations but no sustained episodes.  He denies chest pain, lightheadedness, or syncope.    Past Medical History:   Diagnosis Date    Abnormal ECG     Arrhythmia     Atrial fibrillation and flutter (CMS/Formerly [facility]) 05/03/2018    AVNRT (AV mayra re-entry tachycardia) (CMS/Formerly [facility]) 05/03/2018    Current use of long term anticoagulation 05/03/2018    LEIGH (dyspnea on exertion) 05/19/2025    Long term current use of antiarrhythmic drug 05/03/2018    Prostate cancer (CMS/Formerly [facility])      Past Surgical History   Procedure Laterality Date    Ablation of dysrhythmic focus      Appendectomy      Carpal tunnel release  2022    right       Social History     Tobacco Use    Smoking status: Former    Smokeless tobacco: Never   Vaping Use    Vaping status: Never Used   Substance Use Topics    Alcohol use: Yes     Family History   Problem Relation Name Age of Onset    Cancer Biological Mother      Cancer Biological Father          ALLERGY:  Flecainide    Current Outpatient Medications   Medication Sig Dispense Refill    acetaminophen-codeine (TYLENOL #3) 300-30 mg per tablet Take 1 tablet by mouth every 4 (four) hours as needed for moderate pain.      apixaban (ELIQUIS) 5 mg tablet Take 1 tablet (5 mg total) by mouth 2 (two) times a day. Please schedule an appointment for further refills. 60 tablet 11    ascorbic acid (VITAMIN C) 500 mg tablet Take by mouth daily.      atorvastatin (LIPITOR) 20 mg tablet Take 20 mg by mouth daily.      CHOLECALCIFEROL, VITAMIN D3, (VITAMIN D3 ORAL) Take by mouth daily.      metoprolol tartrate (LOPRESSOR) 25 mg tablet Take 1 tablet (25 mg total) by mouth every 6 (six) hours as needed (for atrial fibrillation). 90 tablet 1    mirabegron (MYRBETRIQ) 50 mg ER tablet Take 50 mg by mouth daily.      propafenone SR (RYTHMOL SR) 425 mg 12 hr capsule Take 1 capsule (425 mg total) by mouth 2 (two) times a day. Please schedule an appointment for further refills. 60 capsule 11    traZODone (DESYREL) 50 mg tablet Take by mouth nightly as needed.      glucosamine sulfate 1,000 mg capsule Take by mouth daily.       No current facility-administered medications for this visit.       Review of Systems   Constitutional: Negative for malaise/fatigue.   HENT:  Negative for hearing loss.    Eyes:  Negative for visual disturbance.   Cardiovascular:  Negative for chest pain, dyspnea on exertion, irregular heartbeat, leg swelling, near-syncope, orthopnea, palpitations, paroxysmal nocturnal dyspnea and syncope.   Respiratory:  Positive for shortness of breath. Negative for cough.    Hematologic/Lymphatic: Negative for bleeding problem.   Skin:  Negative for rash.   Musculoskeletal:  Negative for joint pain and muscle weakness.   Gastrointestinal:  Negative for abdominal pain.   Genitourinary:  Negative for hematuria.   Neurological:  Negative for focal weakness, paresthesias, tremors and weakness.   Psychiatric/Behavioral:   Negative for altered mental status.        Objective   Vitals:    05/19/25 1112   BP: 128/64   Pulse: (!) 53           Physical Exam  Constitutional:       Appearance: He is well-developed.   HENT:      Head: Normocephalic and atraumatic.   Eyes:      Conjunctiva/sclera: Conjunctivae normal.      Pupils: Pupils are equal, round, and reactive to light.   Cardiovascular:      Rate and Rhythm: Normal rate and regular rhythm.      Pulses: Normal pulses.      Heart sounds: Normal heart sounds.   Pulmonary:      Effort: Pulmonary effort is normal.      Breath sounds: Normal breath sounds. No wheezing or rales.   Abdominal:      General: Bowel sounds are normal.      Palpations: Abdomen is soft. There is no mass.      Tenderness: There is no abdominal tenderness.   Musculoskeletal:         General: No deformity. Normal range of motion.      Cervical back: Normal range of motion.   Skin:     General: Skin is warm and dry.      Findings: No rash.   Neurological:      Mental Status: He is alert and oriented to person, place, and time.      Cranial Nerves: No cranial nerve deficit.   Psychiatric:         Behavior: Behavior normal.         Lab Results   Component Value Date    WBC 8.58 11/27/2016    HGB 14.5 11/27/2016     11/27/2016    CHOL 224 (H) 03/31/2015    TRIG 135 03/31/2015    HDL 50 03/31/2015    ALT 33 03/31/2015    AST 28 03/31/2015     11/27/2016    K 4.3 11/27/2016    CREATININE 0.9 11/15/2021    TSH 2.22 03/30/2015    INR 1.0 11/27/2016     Cardiovascular Procedures:  Electrophysiology:  Holter (SR with atrial fibrillation) - 5/2017   QUINCY/cardioversion-April 2017  Catheter ablation-at [facility] for AV node reentry and atrial flutter    ECG   Sinus rhythm and within normal limits.    Assessment   Problem List Items Addressed This Visit       Atrial fibrillation and flutter (CMS/HCC) - Primary    He remains stable on propafenone at 425 mg SR, twice daily.  The patient has had minimal episodes  of atrial fibrillation for the last year.  He is able to perform his daily activities and exercise with no particular issues.  Overall he is arrhythmia seems to be well-controlled.  We will continue with his current cardiovascular medications.  Will have him schedule a stress echo with new complaints of LEIGH and being on propafenone to rule out ischemia.           Relevant Orders    [facility] MUSE ECG 12 lead (clinic performed) (Completed)    Echocardiogram stress test - exercise (stress echo)    AVNRT (AV mayra re-entry tachycardia) (CMS/HCC)    He is status post ablation procedure at [facility] in the past.  He has had no symptoms that are suggestive of a recurrence of this tachycardia.         Long term current use of antiarrhythmic drug    He is tolerating the increased dose of propafenone and the electrocardiogram is without abnormalities at today's visit.  We will consider a stress test for his next appointment to ensure no proarrhythmia or provokable ischemia.          LEIGH (dyspnea on exertion)    He has good walking exercise tolerance.  However, he has been exercising more with his 8-year-old grandson playing activities like throwing a football, chasing a baseball, and hockey.  He has been getting extremely shortness of breath with no complaints of chest pain.  Since he is on propafenone I would like him to undergo an echo stress test to rule out any signs of ischemia and make sure his ejection fraction is normal.    If everything is normal he can continue propafenone follow-up with Dr. Garcia in 1 year.         Relevant Orders    Echocardiogram stress test - exercise (stress echo)     Other Visit Diagnoses         SOB (shortness of breath)        Relevant Orders    Echocardiogram stress test - exercise (stress echo)                   SATISH Macias  05/19/2025      I attest that this visit supports the complexity inherent to evaluation and management associated with medical care services  that serve as the continuing focal point for all needed health care services and/or medical care services that are part of ongoing care related to this patient's single, serious condition or a complex condition.

## 2025-05-19 ENCOUNTER — OFFICE VISIT (OUTPATIENT)
Dept: CARDIOLOGY | Facility: CLINIC | Age: 85
End: 2025-05-19
Payer: MEDICARE

## 2025-05-19 VITALS
WEIGHT: 160 LBS | HEART RATE: 53 BPM | DIASTOLIC BLOOD PRESSURE: 64 MMHG | BODY MASS INDEX: 22.9 KG/M2 | SYSTOLIC BLOOD PRESSURE: 128 MMHG | HEIGHT: 70 IN

## 2025-05-19 DIAGNOSIS — I48.91 ATRIAL FIBRILLATION AND FLUTTER (CMS/HCC): Primary | ICD-10-CM

## 2025-05-19 DIAGNOSIS — R06.02 SOB (SHORTNESS OF BREATH): ICD-10-CM

## 2025-05-19 DIAGNOSIS — R06.09 DOE (DYSPNEA ON EXERTION): ICD-10-CM

## 2025-05-19 DIAGNOSIS — Z79.899 LONG TERM CURRENT USE OF ANTIARRHYTHMIC DRUG: ICD-10-CM

## 2025-05-19 DIAGNOSIS — I47.19 AVNRT (AV NODAL RE-ENTRY TACHYCARDIA) (CMS/HCC): ICD-10-CM

## 2025-05-19 DIAGNOSIS — I48.92 ATRIAL FIBRILLATION AND FLUTTER (CMS/HCC): Primary | ICD-10-CM

## 2025-05-19 LAB
ATRIAL RATE: 53
P AXIS: 62
PR INTERVAL: 204
QRS DURATION: 108
QT INTERVAL: 426
QTC CALCULATION(BAZETT): 399
R AXIS: 63
T WAVE AXIS: 21
VENTRICULAR RATE: 53

## 2025-05-19 PROCEDURE — 93000 ELECTROCARDIOGRAM COMPLETE: CPT | Performed by: NURSE PRACTITIONER

## 2025-05-19 PROCEDURE — G2211 COMPLEX E/M VISIT ADD ON: HCPCS | Performed by: NURSE PRACTITIONER

## 2025-05-19 PROCEDURE — 99214 OFFICE O/P EST MOD 30 MIN: CPT | Performed by: NURSE PRACTITIONER

## 2025-05-20 ENCOUNTER — TELEPHONE (OUTPATIENT)
Dept: CARDIOLOGY | Facility: HOSPITAL | Age: 85
End: 2025-05-20
Payer: MEDICARE

## 2025-05-22 ENCOUNTER — HOSPITAL ENCOUNTER (OUTPATIENT)
Dept: CARDIOLOGY | Facility: HOSPITAL | Age: 85
Discharge: HOME | End: 2025-05-22
Attending: NURSE PRACTITIONER
Payer: MEDICARE

## 2025-05-22 VITALS
BODY MASS INDEX: 23.19 KG/M2 | DIASTOLIC BLOOD PRESSURE: 70 MMHG | RESPIRATION RATE: 16 BRPM | OXYGEN SATURATION: 97 % | WEIGHT: 162 LBS | SYSTOLIC BLOOD PRESSURE: 136 MMHG | HEIGHT: 70 IN | HEART RATE: 60 BPM

## 2025-05-22 DIAGNOSIS — R06.02 SOB (SHORTNESS OF BREATH): ICD-10-CM

## 2025-05-22 DIAGNOSIS — R06.09 DOE (DYSPNEA ON EXERTION): ICD-10-CM

## 2025-05-22 LAB
AORTIC VALVE MEAN VELOCITY: 1.42 M/S
AORTIC VALVE VELOCITY TIME INTEGRAL: 48.2 CM
ASCENDING AORTA: 3.7 CM
AV MEAN GRADIENT: 9 MMHG
AV PEAK GRADIENT: 18 MMHG
AV PEAK VELOCITY-S: 2.14 M/S
AV VALVE AREA INDEX: 1.09
AV VALVE AREA: 1.63 CM2
AV VELOCITY RATIO: 0.46
AVA (VTI): 2.07 CM2
BSA FOR ECHO PROCEDURE: 1.91 M2
DOP CALC LVOT STROKE VOLUME: 99.93 ML
E WAVE DECELERATION TIME: 426 MS
E/A RATIO: 1.1
E/E' RATIO: 6
E/LAT E' RATIO: 5.3
EDV (BP): 88.8 CM3
EF (A4C): 59.1 %
EF A2C: 51.7 %
EJECTION FRACTION: 55.9 %
EST RIGHT VENT SYSTOLIC PRESSURE BY TRICUSPID REGURGITATION JET: 29 MMHG
ESV (BP): 39.2 CM3
LA ESV (BP): 55.7 CM3
LA ESV INDEX (A2C): 39.69 CM3/M2
LA ESV INDEX (BP): 29.16 CM3/M2
LAAS-AP2: 23.6 CM2
LAAS-AP4: 18.1 CM2
LAL MED-LAT (A4C): 6.06 CM
LAV-S: 75.8 CM3
LEFT ATRIAL LENGTH SUPERIOR-INFERIOR (APICAL 2-CHAMBER VIEW): 6.1 CM
LEFT ATRIUM VOLUME INDEX: 21.36 CM3/M2
LEFT ATRIUM VOLUME: 40.8 CM3
LEFT VENTRICLE DIASTOLIC VOLUME INDEX: 45.5 CM3/M2
LEFT VENTRICLE DIASTOLIC VOLUME: 86.9 CM3
LEFT VENTRICLE SYSTOLIC VOLUME INDEX: 18.59 CM3/M2
LEFT VENTRICLE SYSTOLIC VOLUME: 35.5 CM3
LV DIASTOLIC VOLUME: 89.7 CM3
LV ESV (APICAL 2 CHAMBER): 43.3 CM3
LVAD-AP2: 32 CM2
LVAD-AP4: 31.6 CM2
LVAS-AP2: 19.1 CM2
LVAS-AP4: 17.6 CM2
LVEDVI(A2C): 46.96 CM3/M2
LVEDVI(BP): 46.49 CM3/M2
LVESVI(A2C): 22.67 CM3/M2
LVESVI(BP): 20.52 CM3/M2
LVLD-AP2: 9.52 CM
LVLD-AP4: 9.34 CM
LVLS-AP2: 7.76 CM
LVLS-AP4: 7.67 CM
LVOT 2D: 2.4 CM
LVOT A: 4.52 CM2
LVOT MG: 2 MMHG
LVOT MV: 0.68 M/S
LVOT PEAK VELOCITY: 0.98 M/S
LVOT PG: 4 MMHG
LVOT STROKE VOLUME INDEX: 52.32 ML/M2
LVOT VTI: 22.1 CM
MLH CV ECHO AVA INDEX VELOCITY RATIO: 0.9
MV E'TISSUE VEL-LAT: 0.11 M/S
MV E'TISSUE VEL-MED: 0.1 M/S
MV PEAK A VEL: 0.53 M/S
MV PEAK E VEL: 0.58 M/S
MV STENOSIS PRESSURE HALF TIME: 125 MS
MV VALVE AREA P 1/2 METHOD: 1.76 CM2
RAP: 3 MMHG
SEPTAL TISSUE DOPPLER FREE WALL LATE DIA VELOCITY (APICAL 4 CHAMBER VIEW): 0.12 M/S
STRESS BASELINE BP: NORMAL MMHG
STRESS BASELINE HR: 60 BPM
STRESS O2 SAT REST: 97 %
STRESS PERCENT HR: 99 %
STRESS POST ESTIMATED WORKLOAD: 13.4 METS
STRESS POST EXERCISE DUR MIN: 11 MIN
STRESS POST EXERCISE DUR SEC: 25 SEC
STRESS POST PEAK BP: NORMAL MMHG
STRESS POST PEAK HR: 137 BPM
STRESS ST DEPRESSION: 1.5 MM
STRESS TARGET HR: 117 BPM
TR MAX PG: 25.6 MMHG
TRICUSPID VALVE PEAK REGURGITATION VELOCITY: 2.53 M/S

## 2025-05-22 PROCEDURE — 93016 CV STRESS TEST SUPVJ ONLY: CPT | Performed by: INTERNAL MEDICINE

## 2025-05-22 PROCEDURE — 93320 DOPPLER ECHO COMPLETE: CPT | Mod: 26 | Performed by: INTERNAL MEDICINE

## 2025-05-22 PROCEDURE — 93325 DOPPLER ECHO COLOR FLOW MAPG: CPT | Mod: 26 | Performed by: INTERNAL MEDICINE

## 2025-05-22 PROCEDURE — 93018 CV STRESS TEST I&R ONLY: CPT | Performed by: INTERNAL MEDICINE

## 2025-05-22 PROCEDURE — 93350 STRESS TTE ONLY: CPT | Mod: 26 | Performed by: INTERNAL MEDICINE

## 2025-05-22 PROCEDURE — 93325 DOPPLER ECHO COLOR FLOW MAPG: CPT

## 2025-05-23 ENCOUNTER — TELEPHONE (OUTPATIENT)
Dept: SCHEDULING | Facility: CLINIC | Age: 85
End: 2025-05-23
Payer: MEDICARE

## 2025-05-23 DIAGNOSIS — R94.39 POSITIVE CARDIAC STRESS TEST: Primary | ICD-10-CM

## 2025-05-23 RX ORDER — METOPROLOL SUCCINATE 25 MG/1
25 TABLET, EXTENDED RELEASE ORAL 2 TIMES DAILY
Qty: 90 TABLET | Refills: 1 | Status: SHIPPED | OUTPATIENT
Start: 2025-05-23 | End: 2025-06-04 | Stop reason: HOSPADM

## 2025-05-27 PROBLEM — R94.39 POSITIVE CARDIAC STRESS TEST: Status: ACTIVE | Noted: 2025-05-23

## 2025-06-02 ENCOUNTER — TELEPHONE (OUTPATIENT)
Dept: SCHEDULING | Facility: CLINIC | Age: 85
End: 2025-06-02
Payer: MEDICARE

## 2025-06-02 ENCOUNTER — APPOINTMENT (OUTPATIENT)
Dept: LAB | Facility: HOSPITAL | Age: 85
End: 2025-06-02
Payer: MEDICARE

## 2025-06-02 DIAGNOSIS — I48.91 ATRIAL FIBRILLATION AND FLUTTER (CMS/HCC): ICD-10-CM

## 2025-06-02 DIAGNOSIS — R94.39 POSITIVE CARDIAC STRESS TEST: ICD-10-CM

## 2025-06-02 DIAGNOSIS — I48.92 ATRIAL FIBRILLATION AND FLUTTER (CMS/HCC): ICD-10-CM

## 2025-06-02 LAB
ANION GAP SERPL CALC-SCNC: 6 MEQ/L (ref 3–15)
BUN SERPL-MCNC: 13 MG/DL (ref 7–25)
CALCIUM SERPL-MCNC: 9.6 MG/DL (ref 8.6–10.3)
CHLORIDE SERPL-SCNC: 105 MEQ/L (ref 98–107)
CO2 SERPL-SCNC: 28 MEQ/L (ref 21–31)
CREAT SERPL-MCNC: 0.8 MG/DL (ref 0.7–1.3)
EGFRCR SERPLBLD CKD-EPI 2021: >60 ML/MIN/1.73M*2
ERYTHROCYTE [DISTWIDTH] IN BLOOD BY AUTOMATED COUNT: 13.4 % (ref 11.6–14.4)
GLUCOSE SERPL-MCNC: 94 MG/DL (ref 70–99)
HCT VFR BLD AUTO: 40.6 % (ref 40.1–51)
HGB BLD-MCNC: 13.3 G/DL (ref 13.7–17.5)
MCH RBC QN AUTO: 32 PG (ref 28–33.2)
MCHC RBC AUTO-ENTMCNC: 32.8 G/DL (ref 32.2–36.5)
MCV RBC AUTO: 97.6 FL (ref 83–98)
PLATELET # BLD AUTO: 276 K/UL (ref 150–350)
PMV BLD AUTO: 10.5 FL (ref 9.4–12.4)
POTASSIUM SERPL-SCNC: 4.5 MEQ/L (ref 3.5–5.1)
RBC # BLD AUTO: 4.16 M/UL (ref 4.5–5.8)
SODIUM SERPL-SCNC: 139 MEQ/L (ref 136–145)
WBC # BLD AUTO: 7.38 K/UL (ref 3.8–10.5)

## 2025-06-02 PROCEDURE — 85027 COMPLETE CBC AUTOMATED: CPT

## 2025-06-02 PROCEDURE — 80048 BASIC METABOLIC PNL TOTAL CA: CPT

## 2025-06-02 PROCEDURE — 36415 COLL VENOUS BLD VENIPUNCTURE: CPT

## 2025-06-02 NOTE — TELEPHONE ENCOUNTER
Dr Cross patient has cath for Wednesday.      Should he dc his blood thinner?    Please call him at [phone] to discuss. ty

## 2025-06-03 RX ORDER — PROPAFENONE HYDROCHLORIDE 425 MG/1
CAPSULE, EXTENDED RELEASE ORAL
Qty: 60 CAPSULE | Refills: 11 | OUTPATIENT
Start: 2025-06-03

## 2025-06-03 NOTE — TELEPHONE ENCOUNTER
Pt called stated he is scheduled for Cath on 6/4/25 has not received call for arrival time please advise    Pt can be reached at: [phone]

## 2025-06-03 NOTE — TELEPHONE ENCOUNTER
I spoke with the pt and told him that he can proceed with catheterization tomorrow. The pt verbalized understanding.

## 2025-06-03 NOTE — TELEPHONE ENCOUNTER
Pt had bloodwork yesterday in prepration for cath 06/04 and was advised that he is anemic. Pt asking if he should go through with procedure at this time or if the SOB is due to the anemia and if the Cath is needed.    Pt can be reached at [phone]

## 2025-06-04 ENCOUNTER — HOSPITAL ENCOUNTER (OUTPATIENT)
Facility: HOSPITAL | Age: 85
Setting detail: HOSPITAL OUTPATIENT SURGERY
Discharge: HOME | End: 2025-06-04
Attending: INTERNAL MEDICINE | Admitting: INTERNAL MEDICINE
Payer: MEDICARE

## 2025-06-04 VITALS
BODY MASS INDEX: 22.9 KG/M2 | DIASTOLIC BLOOD PRESSURE: 60 MMHG | HEIGHT: 70 IN | RESPIRATION RATE: 20 BRPM | HEART RATE: 52 BPM | WEIGHT: 160 LBS | TEMPERATURE: 97.8 F | SYSTOLIC BLOOD PRESSURE: 126 MMHG | OXYGEN SATURATION: 96 %

## 2025-06-04 DIAGNOSIS — R94.39 POSITIVE CARDIAC STRESS TEST: ICD-10-CM

## 2025-06-04 PROCEDURE — 25000000 HC PHARMACY GENERAL: Performed by: INTERNAL MEDICINE

## 2025-06-04 PROCEDURE — 93458 L HRT ARTERY/VENTRICLE ANGIO: CPT | Performed by: INTERNAL MEDICINE

## 2025-06-04 PROCEDURE — 63600000 HC DRUGS/DETAIL CODE: Performed by: INTERNAL MEDICINE

## 2025-06-04 PROCEDURE — 27200000 HC STERILE SUPPLY: Performed by: INTERNAL MEDICINE

## 2025-06-04 PROCEDURE — 71000001 HC PACU PHASE 1 INITIAL 30MIN: Performed by: INTERNAL MEDICINE

## 2025-06-04 PROCEDURE — C1769 GUIDE WIRE: HCPCS | Performed by: INTERNAL MEDICINE

## 2025-06-04 PROCEDURE — 99152 MOD SED SAME PHYS/QHP 5/>YRS: CPT | Performed by: INTERNAL MEDICINE

## 2025-06-04 PROCEDURE — 4A023N7 MEASUREMENT OF CARDIAC SAMPLING AND PRESSURE, LEFT HEART, PERCUTANEOUS APPROACH: ICD-10-PCS | Performed by: INTERNAL MEDICINE

## 2025-06-04 PROCEDURE — 71000011 HC PACU PHASE 1 EA ADDL MIN: Performed by: INTERNAL MEDICINE

## 2025-06-04 PROCEDURE — 25800000 HC PHARMACY IV SOLUTIONS: Performed by: PHYSICIAN ASSISTANT

## 2025-06-04 PROCEDURE — 93458 L HRT ARTERY/VENTRICLE ANGIO: CPT | Mod: 26 | Performed by: INTERNAL MEDICINE

## 2025-06-04 PROCEDURE — C1894 INTRO/SHEATH, NON-LASER: HCPCS | Performed by: INTERNAL MEDICINE

## 2025-06-04 PROCEDURE — B2111ZZ FLUOROSCOPY OF MULTIPLE CORONARY ARTERIES USING LOW OSMOLAR CONTRAST: ICD-10-PCS | Performed by: INTERNAL MEDICINE

## 2025-06-04 PROCEDURE — 63600105 HC IODINE BASED CONTRAST: Performed by: INTERNAL MEDICINE

## 2025-06-04 PROCEDURE — 63700000 HC SELF-ADMINISTRABLE DRUG: Performed by: PHYSICIAN ASSISTANT

## 2025-06-04 PROCEDURE — 99153 MOD SED SAME PHYS/QHP EA: CPT | Performed by: INTERNAL MEDICINE

## 2025-06-04 RX ORDER — FENTANYL CITRATE 50 UG/ML
INJECTION, SOLUTION INTRAMUSCULAR; INTRAVENOUS
Status: DISCONTINUED | OUTPATIENT
Start: 2025-06-04 | End: 2025-06-04 | Stop reason: HOSPADM

## 2025-06-04 RX ORDER — ASPIRIN 325 MG
325 TABLET ORAL ONCE
Status: COMPLETED | OUTPATIENT
Start: 2025-06-04 | End: 2025-06-04

## 2025-06-04 RX ORDER — NICARDIPINE HCL-0.9% SOD CHLOR 1 MG/10 ML
SYRINGE (ML) INTRAVENOUS
Status: DISCONTINUED | OUTPATIENT
Start: 2025-06-04 | End: 2025-06-04 | Stop reason: HOSPADM

## 2025-06-04 RX ORDER — HEPARIN SODIUM 1000 [USP'U]/ML
INJECTION, SOLUTION INTRAVENOUS; SUBCUTANEOUS
Status: DISCONTINUED | OUTPATIENT
Start: 2025-06-04 | End: 2025-06-04 | Stop reason: HOSPADM

## 2025-06-04 RX ORDER — IOPAMIDOL 612 MG/ML
INJECTION, SOLUTION INTRAVASCULAR
Status: DISCONTINUED | OUTPATIENT
Start: 2025-06-04 | End: 2025-06-04 | Stop reason: HOSPADM

## 2025-06-04 RX ORDER — PROPAFENONE HYDROCHLORIDE 425 MG/1
425 CAPSULE, EXTENDED RELEASE ORAL EVERY 12 HOURS
Qty: 180 CAPSULE | Refills: 1 | Status: SHIPPED | OUTPATIENT
Start: 2025-06-04 | End: 2025-12-01

## 2025-06-04 RX ORDER — SODIUM CHLORIDE 9 MG/ML
40 INJECTION, SOLUTION INTRAVENOUS CONTINUOUS
Status: ACTIVE | OUTPATIENT
Start: 2025-06-04 | End: 2025-06-04

## 2025-06-04 RX ORDER — LIDOCAINE HYDROCHLORIDE 10 MG/ML
INJECTION, SOLUTION INFILTRATION; PERINEURAL
Status: DISCONTINUED | OUTPATIENT
Start: 2025-06-04 | End: 2025-06-04 | Stop reason: HOSPADM

## 2025-06-04 RX ORDER — MIDAZOLAM HYDROCHLORIDE 2 MG/2ML
INJECTION, SOLUTION INTRAMUSCULAR; INTRAVENOUS
Status: DISCONTINUED | OUTPATIENT
Start: 2025-06-04 | End: 2025-06-04 | Stop reason: HOSPADM

## 2025-06-04 RX ADMIN — SODIUM CHLORIDE 40 ML/HR: 9 INJECTION, SOLUTION INTRAVENOUS at 07:33

## 2025-06-04 RX ADMIN — ASPIRIN 325 MG: 325 TABLET ORAL at 07:40

## 2025-06-04 NOTE — DISCHARGE INSTRUCTIONS
Post procedure instructions:  No driving, operating heavy machinery, working, making critical decisions or activities that require balance for 24 hours.  This is because of sedation you received for your procedure.  On day of discharge, limit activities.  No vigorous activity or heavy lifting greater than 10 lbs for the next 2 days after procedure.    Remove dressing next day. Keep site clean and dry.  May shower next day of procedure. Do not submerge catheterization site in pool or tub bath for 7 days  Call if swelling, bleeding, drainage, increased pain from catheterization site.  Call if chest pain, shortness of breath, lightheadedness, near fainting or fainting. If mild symptoms call office; if severe call 911.  Call if fever or any concerns.   If you have bleeding where your catheter was:  If severe call 911, lay down and apply pressure while you wait for help to arrive   If mild and you feel at your baseline, hold direct pressure for 10 minutes. If bleeding does not stop in 10 minutes, or if there is a large amount of  bleeding, call 911. If bleeding does stop, rest for at least 4 hours. Call to notify your doctor.    Medications:   -Stop taking the Toprol (metoprolol succinate)  -Start taking the propafenone  -Restart the Eliquis tomorrow morning, 6/5/25  -Continue other medication as before.  -Call your physician's office with any questions or concerns.    Follow up with Dr. Garcia, [phone]. If a follow up cardiology appointment is not scheduled, please call to arrange one to be seen within the next few months.

## 2025-06-04 NOTE — POST-PROCEDURE NOTE
Cardiovascular Post Procedure Note:    Matthew Rojas  6/4/2025    Pre-op Diagnosis      * Positive cardiac stress test [R94.39]   Post-op Diagnosis     * Positive cardiac stress test [R94.39]     Procedure(s):  LEFT HEART CATH WITH CORONARY ANGIOGRAPHY   Surgeon(s):  Chucky Cross MD Farber, Jason, MD Sturzoiu, Tudor, MD    Findings:  Coronary Angiogram/Left Heart Catheterization   Mild non-obstructive coronary artery disease in the proximal LCx.  Patent RCA and LAD with minimal luminal irregularities.  LVEDP 12 mm Hg.  At the end of the procedure, hemostasis was achieved using a TR band.     Recommendations  1. Medical management.  2. Lifestyle and risk factor modifications.     Anesthesia:  Moderate Sedation    Staff:   Circulator: Mari Morris RN  Documenter: Candy Horn RN     Estimated Blood Loss:   5 mL     Specimens:   No specimens collected during this procedure.     Implants:   Nothing was implanted during the procedure     Complications:  None    Date: 6/4/2025 Time: 10:20 AM

## 2025-06-04 NOTE — PRE-PROCEDURE NOTE
Cardiac Cath Lab Pre-procedure Note    - Patient was seen and examined at bedside.  - The patient's chart and all data was reviewed.  - The procedure, treatment alternatives, risks and benefits were explained with specific risks discussed.  - Patient was consented for cardiac cath procedure and possible PCI.  - Patient's case was found appropriate for dual antiplatelet therapy.    Indication for procedure: Pre-Op Diagnosis Codes:      * Positive cardiac stress test [R94.39]    Patient's clinical presentation to the cardiac cath lab: stable ischemic symptoms.      Patient appears to be managing well.     Notable Non-Invasive Cardiac Testing    - Stress Test: indeterminate stress echo,               Total anti-anginal medications: none.     Patient is presenting today with no CHF.    Pre-sedation assessment  ASA 2  Mallampati class: III - soft palate, base of uvula visible.

## 2025-06-04 NOTE — TREATMENT PLAN
Post procedure discussed pt with Dr. Cross and Dr. Garcia    Per Dr. Garcia ok to restart the propafenone SR 425mg PO q 12H and discontinue the Toprol.  Dr. Garcia also discussed this with pt who demonstrated good verbal understanding of the plan  Pt recovering well in holding area; no complaints  Fu as OP with EP and cards

## 2025-06-04 NOTE — Clinical Note
Patient placed on procedure table in supine position with right arm extended. Positioning devices: all pressure points padded and arm board under arms.

## 2025-07-10 ENCOUNTER — TELEPHONE (OUTPATIENT)
Dept: SCHEDULING | Facility: CLINIC | Age: 85
End: 2025-07-10
Payer: MEDICARE

## 2025-07-18 ENCOUNTER — TELEPHONE (OUTPATIENT)
Dept: SCHEDULING | Facility: CLINIC | Age: 85
End: 2025-07-18

## 2025-08-29 DIAGNOSIS — I48.91 ATRIAL FIBRILLATION AND FLUTTER (CMS/HCC): ICD-10-CM

## 2025-08-29 DIAGNOSIS — I48.92 ATRIAL FIBRILLATION AND FLUTTER (CMS/HCC): ICD-10-CM

## (undated) DEVICE — CATH 6FR FL3.5

## (undated) DEVICE — ***USE 121412***PACK DEVICE IMPLANT TLH

## (undated) DEVICE — CATH INTROCAN SAFETY FEP 20G X 1IN

## (undated) DEVICE — KIT CATH LAB ANGIO

## (undated) DEVICE — GUIDEWIRE DIAGNOSTIC 035-260 EXCHANGE

## (undated) DEVICE — CATH 6F FR4

## (undated) DEVICE — GUIDEWIRE DIAGNOSTIC J .035. 150 (ORDER IN 5'S)

## (undated) DEVICE — TR BAND REGULAR

## (undated) DEVICE — GLIDESHEATH SLENDER SS (.021) 6FR 10CM